# Patient Record
Sex: FEMALE | Race: WHITE | NOT HISPANIC OR LATINO | Employment: FULL TIME | URBAN - METROPOLITAN AREA
[De-identification: names, ages, dates, MRNs, and addresses within clinical notes are randomized per-mention and may not be internally consistent; named-entity substitution may affect disease eponyms.]

---
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
## 2018-01-13 VITALS
RESPIRATION RATE: 12 BRPM | HEART RATE: 87 BPM | SYSTOLIC BLOOD PRESSURE: 120 MMHG | DIASTOLIC BLOOD PRESSURE: 78 MMHG | TEMPERATURE: 98.6 F | HEIGHT: 70 IN | BODY MASS INDEX: 28.27 KG/M2 | WEIGHT: 197.5 LBS

## 2022-05-17 ENCOUNTER — OFFICE VISIT (OUTPATIENT)
Dept: PHYSICAL THERAPY | Facility: REHABILITATION | Age: 22
End: 2022-05-17
Payer: COMMERCIAL

## 2022-05-17 DIAGNOSIS — G89.29 CHRONIC BILATERAL LOW BACK PAIN WITHOUT SCIATICA: Primary | ICD-10-CM

## 2022-05-17 DIAGNOSIS — M54.50 CHRONIC BILATERAL LOW BACK PAIN WITHOUT SCIATICA: Primary | ICD-10-CM

## 2022-05-17 PROCEDURE — 97162 PT EVAL MOD COMPLEX 30 MIN: CPT | Performed by: PHYSICAL THERAPIST
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
## 2022-05-24 ENCOUNTER — OFFICE VISIT (OUTPATIENT)
Dept: PHYSICAL THERAPY | Facility: REHABILITATION | Age: 22
End: 2022-05-24
Payer: COMMERCIAL

## 2022-05-24 DIAGNOSIS — G89.29 CHRONIC BILATERAL LOW BACK PAIN WITHOUT SCIATICA: Primary | ICD-10-CM

## 2022-05-24 DIAGNOSIS — M54.50 CHRONIC BILATERAL LOW BACK PAIN WITHOUT SCIATICA: Primary | ICD-10-CM

## 2022-05-24 PROCEDURE — 97112 NEUROMUSCULAR REEDUCATION: CPT | Performed by: PHYSICAL THERAPIST

## 2022-05-24 NOTE — PROGRESS NOTES
Daily Note     Today's date: 2022  Patient name: Gilma Mcnally  : 2000  MRN: [id number]  Referring provider: Fabiene Barthel, PT  Dx:   Encounter Diagnosis     ICD-10-CM    1  Chronic bilateral low back pain without sciatica  M54 50     G89 29                   Subjective: Upper back is sore when she does the exercises  Objective: See treatment diary below  RFIS: no effect  CHITO: ER pain no worse      Assessment: Pt is very compliant with HEP  Upperback pain due to strain from altering lumbar positioning  Instructed her in an extension stretch for her thoracic region  She demonstrates Rhoda classification of extension dysfunction in the lumbar spine so prone press ups were added to address this issue  Plan: Continue per plan of care  Precautions: Hx of pars fracture  Daily Treatment Diary      Assessment                     Eval/Reval                       FOTO         **         **   Manuals                                                     Exercise Diary    Pelvic tilts 10"x10 10"x10                    pelvic tilt with ball press 10"x10 10"x10                    pelvic tilt with march x10 Dead bugs 2x10                    hip flexor stretch 30"x4  30"x4                    bird dog    x10                    multifidus press    2 steps x5                    thoracic ext over foam roll   x10                    KB squat                                                                                                                                                                                                                       Modalities                                           Access Code: [email or web address]  URL: [email or web address]  Date: 2022  Prepared by: José Antonio Brar    Exercises  Supine Pelvic Tilt - 1 x daily - 3 x weekly - 1 sets - 10 reps - 10 sec hold  Supine March with Posterior Pelvic Tilt - 1 x daily - 7 x weekly - 1 sets - 10 reps - 3 sec hold  Abdominal Press into [place] - 1 x daily - 7 x weekly - 1 sets - 10 reps - 10 sec hold  Half Kneeling Hip Flexor Stretch - 1 x daily - 7 x weekly - 1 sets - 4 reps - 30 sec hold

## 2022-06-24 ENCOUNTER — HOSPITAL ENCOUNTER (OUTPATIENT)
Dept: ULTRASOUND IMAGING | Facility: CLINIC | Age: 22
Discharge: HOME | End: 2022-06-24
Payer: COMMERCIAL

## 2022-06-24 DIAGNOSIS — R10.2 PELVIC PAIN: ICD-10-CM

## 2022-06-24 PROCEDURE — 76830 TRANSVAGINAL US NON-OB: CPT

## 2022-06-24 PROCEDURE — 76856 US EXAM PELVIC COMPLETE: CPT

## 2023-01-03 ENCOUNTER — PROCEDURE VISIT (OUTPATIENT)
Dept: OBGYN CLINIC | Facility: CLINIC | Age: 23
End: 2023-01-03

## 2023-01-03 VITALS
DIASTOLIC BLOOD PRESSURE: 82 MMHG | SYSTOLIC BLOOD PRESSURE: 124 MMHG | BODY MASS INDEX: 38.51 KG/M2 | HEIGHT: 70 IN | WEIGHT: 269 LBS

## 2023-01-03 DIAGNOSIS — Z30.432 ENCOUNTER FOR IUD REMOVAL: Primary | ICD-10-CM

## 2023-01-03 RX ORDER — ALBUTEROL SULFATE 90 UG/1
AEROSOL, METERED RESPIRATORY (INHALATION)
COMMUNITY
Start: 2022-11-15 | End: 2023-01-03

## 2023-01-03 RX ORDER — COPPER 313.4 MG/1
1 INTRAUTERINE DEVICE INTRAUTERINE
COMMUNITY

## 2023-01-03 RX ORDER — ALBUTEROL SULFATE 90 UG/1
2 AEROSOL, METERED RESPIRATORY (INHALATION) EVERY 6 HOURS PRN
COMMUNITY
Start: 2022-11-15 | End: 2023-01-03

## 2023-01-03 NOTE — PROGRESS NOTES
Iud removal    Date/Time: 1/3/2023 10:50 AM  Performed by: Zehra Blandon MD  Authorized by: Zehra Blandon MD   Universal Protocol:  Consent: Verbal consent obtained  Written consent not obtained  Risks and benefits: risks, benefits and alternatives were discussed  Consent given by: patient  Time out: Immediately prior to procedure a "time out" was called to verify the correct patient, procedure, equipment, support staff and site/side marked as required  Timeout called at: 1/3/2023 10:30 AM   Patient understanding: patient states understanding of the procedure being performed  Patient consent: the patient's understanding of the procedure matches consent given  Procedure consent: procedure consent matches procedure scheduled  Relevant documents: relevant documents present and verified  Test results: test results not available  Site marked: the operative site was not marked  Radiology Images displayed and confirmed  If images not available, report reviewed: imaging studies available  Required items: required blood products, implants, devices, and special equipment available  Patient identity confirmed: verbally with patient      Procedure:     Removed with no complications: yes    Comments:      Paragard removed intact, shown to patient, and discarded  Encouraged to take PNV with folic acid   Call with any questions/ concerns    Follow up yearly or prn

## 2023-08-08 ENCOUNTER — OFFICE VISIT (OUTPATIENT)
Dept: OBGYN CLINIC | Facility: CLINIC | Age: 23
End: 2023-08-08
Payer: COMMERCIAL

## 2023-08-08 VITALS
HEIGHT: 70 IN | BODY MASS INDEX: 37.37 KG/M2 | SYSTOLIC BLOOD PRESSURE: 128 MMHG | DIASTOLIC BLOOD PRESSURE: 76 MMHG | WEIGHT: 261 LBS

## 2023-08-08 DIAGNOSIS — N91.2 AMENORRHEA: Primary | ICD-10-CM

## 2023-08-08 PROCEDURE — 76817 TRANSVAGINAL US OBSTETRIC: CPT | Performed by: OBSTETRICS & GYNECOLOGY

## 2023-08-08 PROCEDURE — 99214 OFFICE O/P EST MOD 30 MIN: CPT | Performed by: OBSTETRICS & GYNECOLOGY

## 2023-08-08 NOTE — PROGRESS NOTES
Assessment/Plan:  Diagnoses and all orders for this visit:    Amenorrhea  -     Ambulatory Referral to Maternal Fetal Medicine; Future  -     AMB US OB < 14 weeks single or first gestation level 1    Other orders  -     Prenatal MV-Min-Fe Fum-FA-DHA (PRENATAL 1 PO); Take by mouth        - Viable IUP @ 8w 6d EGA  - ZEE 2024  - Continue PNV  - Patient to call for concerns  - RTO 3 weeks for OB intake            Subjective:       Patient ID: Rozina Collins 2000        Rozina Collins is a 23 y.o. Audrea Reil presenting to the office for pregnancy confirmation. Patient's last menstrual period was 2023 (exact date). , placing her at [address] [facility]"" today with ZEE of 2024. She is feeling nauseous        OB History    Para Term  AB Living   0 0 0 0 0 0   SAB IAB Ectopic Multiple Live Births   0 0 0 0 0   Obstetric Comments   Menarche 10         The following portions of the patient's history were reviewed and updated as appropriate: allergies, past family history, past social history and problem list.    Allergies:  Patient has no known allergies. Medications:    Current Outpatient Medications:   •  Prenatal MV-Min-Fe Fum-FA-DHA (PRENATAL 1 PO), Take by mouth, Disp: , Rfl:   •  intrauterine copper (PARAGARD) IUD, 1 each by Intrauterine route (Patient not taking: Reported on 2023), Disp: , Rfl:       Review of Systems:   Review of Systems       Objective:       Visit Vitals  /76 (BP Location: Right arm, Patient Position: Sitting, Cuff Size: Large)   Ht 5' 9.5" (1.765 m)   Wt 118 kg (261 lb)   LMP 2023 (Exact Date)   BMI 37.99 kg/m²   OB Status Unknown   Smoking Status Never   BSA 2.32 m²        GEN: The patient was alert and oriented x3, pleasant well-appearing female in no acute distress.    CV: Regular rate  PULM: nonlabored respirations  MSK: Normal gait  Skin: warm, dry  Neuro: no focal deficits  Psych: normal affect and judgement, cooperative    Ultrasound:     Viability US Gestational sac: present               Location: intrauterine  Yolk sac: present  Fetal pole: present               CRL: 1.96 cm = 8w4d  Cardiac activity: present               Rate: 174 bpm     Ovaries: normal appearing bilaterally  Cul de sac: absence of free fluid  Uterus: normal in appearance           Ultrasound Probe Disinfection    A transvaginal ultrasound was performed.    Prior to use, disinfection was performed with High Level Disinfection Process (Trophon)  Probe serial number RVRSDE: [id number] was used    Sanaz Weiner MD  08/08/23  7:59 AM

## 2023-08-30 ENCOUNTER — INITIAL PRENATAL (OUTPATIENT)
Dept: OBGYN CLINIC | Facility: CLINIC | Age: 23
End: 2023-08-30

## 2023-08-30 ENCOUNTER — APPOINTMENT (OUTPATIENT)
Age: 23
End: 2023-08-30
Payer: COMMERCIAL

## 2023-08-30 VITALS — HEIGHT: 70 IN | BODY MASS INDEX: 37.16 KG/M2 | WEIGHT: 259.6 LBS

## 2023-08-30 DIAGNOSIS — O99.211 OBESITY AFFECTING PREGNANCY IN FIRST TRIMESTER: ICD-10-CM

## 2023-08-30 DIAGNOSIS — Z3A.12 12 WEEKS GESTATION OF PREGNANCY: ICD-10-CM

## 2023-08-30 DIAGNOSIS — O99.211 OBESITY AFFECTING PREGNANCY IN FIRST TRIMESTER: Primary | ICD-10-CM

## 2023-08-30 LAB
BASOPHILS # BLD AUTO: 0.02 THOUSANDS/ÂΜL (ref 0–0.1)
BASOPHILS NFR BLD AUTO: 0 % (ref 0–1)
EOSINOPHIL # BLD AUTO: 0.05 THOUSAND/ÂΜL (ref 0–0.61)
EOSINOPHIL NFR BLD AUTO: 1 % (ref 0–6)
ERYTHROCYTE [DISTWIDTH] IN BLOOD BY AUTOMATED COUNT: 14.6 % (ref 11.6–15.1)
HCT VFR BLD AUTO: 40.6 % (ref 34.8–46.1)
HGB BLD-MCNC: 13.6 G/DL (ref 11.5–15.4)
IMM GRANULOCYTES # BLD AUTO: 0.02 THOUSAND/UL (ref 0–0.2)
IMM GRANULOCYTES NFR BLD AUTO: 0 % (ref 0–2)
LYMPHOCYTES # BLD AUTO: 2.07 THOUSANDS/ÂΜL (ref 0.6–4.47)
LYMPHOCYTES NFR BLD AUTO: 25 % (ref 14–44)
MCH RBC QN AUTO: 29.7 PG (ref 26.8–34.3)
MCHC RBC AUTO-ENTMCNC: 33.5 G/DL (ref 31.4–37.4)
MCV RBC AUTO: 89 FL (ref 82–98)
MONOCYTES # BLD AUTO: 0.54 THOUSAND/ÂΜL (ref 0.17–1.22)
MONOCYTES NFR BLD AUTO: 7 % (ref 4–12)
NEUTROPHILS # BLD AUTO: 5.56 THOUSANDS/ÂΜL (ref 1.85–7.62)
NEUTS SEG NFR BLD AUTO: 67 % (ref 43–75)
NRBC BLD AUTO-RTO: 0 /100 WBCS
PLATELET # BLD AUTO: 325 THOUSANDS/UL (ref 149–390)
PMV BLD AUTO: 9.5 FL (ref 8.9–12.7)
RBC # BLD AUTO: 4.58 MILLION/UL (ref 3.81–5.12)
WBC # BLD AUTO: 8.26 THOUSAND/UL (ref 4.31–10.16)

## 2023-08-30 PROCEDURE — 86900 BLOOD TYPING SEROLOGIC ABO: CPT

## 2023-08-30 PROCEDURE — 87086 URINE CULTURE/COLONY COUNT: CPT

## 2023-08-30 PROCEDURE — 87389 HIV-1 AG W/HIV-1&-2 AB AG IA: CPT

## 2023-08-30 PROCEDURE — 87340 HEPATITIS B SURFACE AG IA: CPT

## 2023-08-30 PROCEDURE — 86850 RBC ANTIBODY SCREEN: CPT

## 2023-08-30 PROCEDURE — 86780 TREPONEMA PALLIDUM: CPT

## 2023-08-30 PROCEDURE — OBC

## 2023-08-30 PROCEDURE — 86762 RUBELLA ANTIBODY: CPT

## 2023-08-30 PROCEDURE — 86803 HEPATITIS C AB TEST: CPT

## 2023-08-30 PROCEDURE — 85025 COMPLETE CBC W/AUTO DIFF WBC: CPT

## 2023-08-30 PROCEDURE — 86787 VARICELLA-ZOSTER ANTIBODY: CPT

## 2023-08-30 PROCEDURE — 36415 COLL VENOUS BLD VENIPUNCTURE: CPT

## 2023-08-30 PROCEDURE — 86901 BLOOD TYPING SEROLOGIC RH(D): CPT

## 2023-08-30 NOTE — PROGRESS NOTES
OB INTAKE INTERVIEW  Pt presents for OB intake. Plan:  - Prenatal labs ordered  - Referral given for MFM   -NT scheduled for  and level 2 10/26  - Reviewed Genetic testing options   -Declined CF and SMA at this time  - Patient to call for concerns  - RTO 4 weeks for OB F/U visit and PAP/Cultures         OB History    Para Term  AB Living   1 0 0 0 0 0   SAB IAB Ectopic Multiple Live Births   0 0 0 0 0      # Outcome Date GA Lbr Jose/2nd Weight Sex Delivery Anes PTL Lv   1 Current               Obstetric Comments   Menarche 14     Hx of  delivery prior to 36 weeks 6 days: No  Last Menstrual Period:    Patient's last menstrual period was 2023 (exact date). Ultrasound date: 2023  8 weeks 6 days     Estimated Date of Delivery: 3/13/2023    Current Issues:  Constipation :   No  Headaches :   Yes  Cramping:  No  Spotting :   No      Interview education  • [facility] Pregnancy Essentials reviewed and discussed   • Baby and [address] Handout  • [facility] MFM Handout  • Discussed genetic testing  • Prenatal lab work: Scripts printed and given to pt. • Influenza vaccine given today: No  • Discussed Tdap vaccine.    Immunizations:   Immunization History   Administered Date(s) Administered   • DTaP 5 2000, 1999, 1999, 2000, 10/25/2004   • Hep B, adult 2000, 01/15/1999, 1999   • Hib (PRP-OMP) 1998, 1999, 1999, 2000   • IPV 2000, 10/21/2002   • MMR 10/25/2004   • MMRV 2007   • Meningococcal, Unknown Serogroups 10/28/2010, 2017   • OPV 01/15/1999, 1999, 10/26/1999   • Rotavirus Pentavalent 01/15/1999, 1999, 1999   • Tdap 2011   • Varicella 2000       Diabetes              Pregestational DM: No               hx of GDM: No              BMI >35: Yes              first degree relative with type 2 diabetes: No              hx of PCOS: No              current metformin use: No              prior hx of LGA/macrosomia: No                   Hypertension              Hx of chronic HTN: No              hx of gestational HTN: No              hx of preeclampsia, eclampsia, or HELLP syndrome: No              Family h/o preeclampsia: No              Age 28 or older: No              Multifetal gestation: No  Type 1 or Type 2 DM: No  Renal Disease: No  Autoimmune disease (systemic lupus erythematosus, antiphospholipid antibody syndrome): No  Nulliparity: Yes  Obesity (BMI over 30): Yes  More than 10 year pregnancy interval: No  Previous IUGR, low birthweight or small for gestational age: No       Immunizations:              influenza vaccine: N/A               discussed Tdap vaccine administration at 27-28 weeks   Covid Vaccination: Vaccinated      Dental visit with last 6 months - Yes  PHQ-2/9 score: 0         MyChart activated (not 1318 years of age)?: Yes  The patient was oriented to our practice and all questions were answered.   Interviewed by: Elizabeth Zambrano RN 08/30/23

## 2023-08-31 LAB
ABO GROUP BLD: NORMAL
BLD GP AB SCN SERPL QL: NEGATIVE
HBV SURFACE AG SER QL: NORMAL
HCV AB SER QL: NORMAL
RH BLD: POSITIVE
RUBV IGG SERPL IA-ACNC: 102.5 IU/ML
SPECIMEN EXPIRATION DATE: NORMAL
TREPONEMA PALLIDUM IGG+IGM AB [PRESENCE] IN SERUM OR PLASMA BY IMMUNOASSAY: NORMAL
VZV IGG SER QL IA: ABNORMAL

## 2023-09-01 LAB
BACTERIA UR CULT: NORMAL
EXTERNAL HIV-1/2 AB-AG: NORMAL
HIV 1+2 AB+HIV1 P24 AG SERPL QL IA: NON REACTIVE

## 2023-09-05 ENCOUNTER — ROUTINE PRENATAL (OUTPATIENT)
Dept: PERINATAL CARE | Facility: OTHER | Age: 23
End: 2023-09-05
Payer: COMMERCIAL

## 2023-09-05 VITALS
HEIGHT: 70 IN | HEART RATE: 92 BPM | DIASTOLIC BLOOD PRESSURE: 68 MMHG | BODY MASS INDEX: 36.79 KG/M2 | SYSTOLIC BLOOD PRESSURE: 112 MMHG | WEIGHT: 257 LBS

## 2023-09-05 DIAGNOSIS — Z36.82 ENCOUNTER FOR (NT) NUCHAL TRANSLUCENCY SCAN: ICD-10-CM

## 2023-09-05 DIAGNOSIS — O99.211 OBESITY AFFECTING PREGNANCY IN FIRST TRIMESTER: ICD-10-CM

## 2023-09-05 DIAGNOSIS — Z3A.12 12 WEEKS GESTATION OF PREGNANCY: Primary | ICD-10-CM

## 2023-09-05 DIAGNOSIS — N91.2 AMENORRHEA: ICD-10-CM

## 2023-09-05 PROCEDURE — 76801 OB US < 14 WKS SINGLE FETUS: CPT | Performed by: STUDENT IN AN ORGANIZED HEALTH CARE EDUCATION/TRAINING PROGRAM

## 2023-09-05 PROCEDURE — 76813 OB US NUCHAL MEAS 1 GEST: CPT | Performed by: STUDENT IN AN ORGANIZED HEALTH CARE EDUCATION/TRAINING PROGRAM

## 2023-09-05 PROCEDURE — 99203 OFFICE O/P NEW LOW 30 MIN: CPT | Performed by: STUDENT IN AN ORGANIZED HEALTH CARE EDUCATION/TRAINING PROGRAM

## 2023-09-05 RX ORDER — ASPIRIN 81 MG/1
162 TABLET, CHEWABLE ORAL DAILY
Qty: 180 TABLET | Refills: 2 | Status: SHIPPED | OUTPATIENT
Start: 2023-09-05

## 2023-09-05 NOTE — PROGRESS NOTES
[address]: Ms. Xi Treadwell was seen today for nuchal translucency ultrasound. See ultrasound report under "OB Procedures" tab. Physical Exam  Constitutional:       General: She is not in acute distress. Appearance: Normal appearance. HENT:      Head: Normocephalic and atraumatic. Eyes:      Extraocular Movements: Extraocular movements intact. Cardiovascular:      Rate and Rhythm: Normal rate. Pulmonary:      Effort: Pulmonary effort is normal. No respiratory distress. Skin:     Findings: No erythema or rash. Neurological:      Mental Status: She is alert and oriented to person, place, and time. Psychiatric:         Mood and Affect: Mood normal.         Behavior: Behavior normal.         Please don't hesitate to contact our office with any concerns or questions.   -Brandy Huffman MD

## 2023-09-05 NOTE — LETTER
September 5, 2023     [facility], [address] [place] [address]  [address]  [facility]    Patient: Rylee Church   YOB: 2000   Date of Visit: 9/5/2023       Dear Dr. Lana Barrett:    Thank you for referring Rylee Church to me for evaluation. Below are my notes for this consultation. If you have questions, please do not hesitate to call me. I look forward to following your patient along with you. Sincerely,        Marylene No, MD        CC: No Recipients    Marylene No, MD  9/5/2023  9:23 AM  Sign when Signing Visit  [address]: Ms. Red Rouse was seen today for nuchal translucency ultrasound. See ultrasound report under "OB Procedures" tab. Physical Exam  Constitutional:       General: She is not in acute distress. Appearance: Normal appearance. HENT:      Head: Normocephalic and atraumatic. Eyes:      Extraocular Movements: Extraocular movements intact. Cardiovascular:      Rate and Rhythm: Normal rate. Pulmonary:      Effort: Pulmonary effort is normal. No respiratory distress. Skin:     Findings: No erythema or rash. Neurological:      Mental Status: She is alert and oriented to person, place, and time. Psychiatric:         Mood and Affect: Mood normal.         Behavior: Behavior normal.         Please don't hesitate to contact our office with any concerns or questions.   -Marylene No, MD

## 2023-09-09 ENCOUNTER — HOSPITAL ENCOUNTER (EMERGENCY)
Facility: HOSPITAL | Age: 23
Discharge: HOME | End: 2023-09-09
Attending: EMERGENCY MEDICINE
Payer: COMMERCIAL

## 2023-09-09 VITALS
HEIGHT: 70 IN | SYSTOLIC BLOOD PRESSURE: 123 MMHG | OXYGEN SATURATION: 100 % | DIASTOLIC BLOOD PRESSURE: 65 MMHG | RESPIRATION RATE: 18 BRPM | TEMPERATURE: 97.3 F | HEART RATE: 83 BPM | BODY MASS INDEX: 36.94 KG/M2 | WEIGHT: 258 LBS

## 2023-09-09 DIAGNOSIS — Z34.90 PREGNANT: ICD-10-CM

## 2023-09-09 DIAGNOSIS — T75.00XA: Primary | ICD-10-CM

## 2023-09-09 PROCEDURE — 76815 OB US LIMITED FETUS(S): CPT | Performed by: EMERGENCY MEDICINE

## 2023-09-09 PROCEDURE — 93005 ELECTROCARDIOGRAM TRACING: CPT

## 2023-09-09 PROCEDURE — 99284 EMERGENCY DEPT VISIT MOD MDM: CPT

## 2023-09-09 PROCEDURE — 99284 EMERGENCY DEPT VISIT MOD MDM: CPT | Performed by: EMERGENCY MEDICINE

## 2023-09-10 LAB
ATRIAL RATE: 99 BPM
P AXIS: 75 DEGREES
PR INTERVAL: 162 MS
QRS AXIS: 74 DEGREES
QRSD INTERVAL: 76 MS
QT INTERVAL: 334 MS
QTC INTERVAL: 428 MS
T WAVE AXIS: 35 DEGREES
VENTRICULAR RATE: 99 BPM

## 2023-09-10 PROCEDURE — 93010 ELECTROCARDIOGRAM REPORT: CPT | Performed by: INTERNAL MEDICINE

## 2023-09-10 NOTE — ED NOTES
Physician at bedside performing abdominal ultrasound with patient.       Fernando Marin RN  09/09/23 2043
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
## 2023-09-28 ENCOUNTER — ROUTINE PRENATAL (OUTPATIENT)
Dept: OBGYN CLINIC | Facility: CLINIC | Age: 23
End: 2023-09-28

## 2023-09-28 VITALS — WEIGHT: 257.8 LBS | SYSTOLIC BLOOD PRESSURE: 114 MMHG | DIASTOLIC BLOOD PRESSURE: 76 MMHG | BODY MASS INDEX: 36.99 KG/M2

## 2023-09-28 DIAGNOSIS — Z36.9 ANTENATAL SCREENING ENCOUNTER: ICD-10-CM

## 2023-09-28 DIAGNOSIS — Z34.02 PRENATAL CARE, FIRST PREGNANCY IN SECOND TRIMESTER: Primary | ICD-10-CM

## 2023-09-28 PROCEDURE — PNV: Performed by: PHYSICIAN ASSISTANT

## 2023-09-28 PROCEDURE — G0145 SCR C/V CYTO,THINLAYER,RESCR: HCPCS | Performed by: PHYSICIAN ASSISTANT

## 2023-09-28 PROCEDURE — 87591 N.GONORRHOEAE DNA AMP PROB: CPT | Performed by: PHYSICIAN ASSISTANT

## 2023-09-28 PROCEDURE — 87491 CHLMYD TRACH DNA AMP PROBE: CPT | Performed by: PHYSICIAN ASSISTANT

## 2023-09-28 NOTE — PROGRESS NOTES
23 y.o. Safia Loredo female at 16w1d (Estimated Date of Delivery: 3/13/24) for PNV. Pre- Vitals    Flowsheet Row Most Recent Value   Prenatal Assessment    Prenatal Vitals    Blood Pressure 114/76   Weight - Scale 117 kg (257 lb 12.8 oz)   Urine Albumin/Glucose    Dilation/Effacement/Station    Vaginal Drainage    Edema         TWG: -3.266 kg (-7 lb 3.2 oz)    Leakage of fluid: no  Vaginal bleeding: no  Contractions/Cramping: no  Fetal movement: no  Feeling well  Declines all early extra testing  Routine ob BW normal except not immune to varicella--needs pp vaccine  Pap done today  Flu shot declined  RTO in 4 weeks.

## 2023-10-02 LAB
C TRACH DNA SPEC QL NAA+PROBE: NEGATIVE
N GONORRHOEA DNA SPEC QL NAA+PROBE: NEGATIVE

## 2023-10-05 LAB
LAB AP GYN PRIMARY INTERPRETATION: NORMAL
Lab: NORMAL

## 2023-10-25 ENCOUNTER — ROUTINE PRENATAL (OUTPATIENT)
Dept: OBGYN CLINIC | Facility: CLINIC | Age: 23
End: 2023-10-25

## 2023-10-25 VITALS — SYSTOLIC BLOOD PRESSURE: 128 MMHG | WEIGHT: 260 LBS | DIASTOLIC BLOOD PRESSURE: 76 MMHG | BODY MASS INDEX: 37.31 KG/M2

## 2023-10-25 DIAGNOSIS — O99.212 OBESITY AFFECTING PREGNANCY IN SECOND TRIMESTER, UNSPECIFIED OBESITY TYPE: ICD-10-CM

## 2023-10-25 DIAGNOSIS — Z3A.20 20 WEEKS GESTATION OF PREGNANCY: Primary | ICD-10-CM

## 2023-10-25 PROBLEM — Z34.82 PRENATAL CARE, SUBSEQUENT PREGNANCY IN SECOND TRIMESTER: Status: RESOLVED | Noted: 2023-10-25 | Resolved: 2023-10-25

## 2023-10-25 PROBLEM — Z34.82 PRENATAL CARE, SUBSEQUENT PREGNANCY IN SECOND TRIMESTER: Status: ACTIVE | Noted: 2023-10-25

## 2023-10-25 PROBLEM — M40.209 ACQUIRED KYPHOSIS: Status: ACTIVE | Noted: 2020-06-18

## 2023-10-25 PROCEDURE — PNV: Performed by: OBSTETRICS & GYNECOLOGY

## 2023-10-25 NOTE — PATIENT INSTRUCTIONS
Please refer to [place] Pregnancy Essentials Guide  [facility] ([email or web address])  to access our pregnancy essentials reference guide. Here you will find a lot of information for all trimesters of pregnancy as well as postpartum. You will be able to access information about medications that are safe to use during pregnancy, warning signs in third trimester, what to pack for your hospital stay and many other useful guides. There is also information on class available through our [address] and Pregnancy Classes  [facility] ([email or web address]). Please do not hesitate to contact the office through [address] [place] [address] or by calling for [phone] with any questions or concerns. We look forward to seeing you at your next scheduled visit!

## 2023-10-25 NOTE — PROGRESS NOTES
20 y.o. Neto Aceves female at 24w0d (Estimated Date of Delivery: 3/13/24) for PNV. She is doing well, no concerns. Pre- Vitals      Flowsheet Row Most Recent Value   Prenatal Assessment    Fetal Heart Rate 160   Fundal Height (cm) 20 cm   Movement Present   Prenatal Vitals    Blood Pressure 128/76   Weight - Scale 118 kg (260 lb)   Urine Albumin/Glucose    Dilation/Effacement/Station    Vaginal Drainage    Edema         TWG: -2.268 kg (-5 lb)    Leakage of fluid: no  Vaginal bleeding: no  Contractions/Cramping: no  Fetal movement: yes    She has anatomy scan scheduled tomorrow. RTO in 4 weeks.

## 2023-10-26 ENCOUNTER — ROUTINE PRENATAL (OUTPATIENT)
Dept: PERINATAL CARE | Facility: OTHER | Age: 23
End: 2023-10-26
Payer: COMMERCIAL

## 2023-10-26 VITALS
HEIGHT: 70 IN | DIASTOLIC BLOOD PRESSURE: 64 MMHG | HEART RATE: 102 BPM | BODY MASS INDEX: 37.22 KG/M2 | WEIGHT: 260 LBS | SYSTOLIC BLOOD PRESSURE: 122 MMHG

## 2023-10-26 DIAGNOSIS — Z3A.20 20 WEEKS GESTATION OF PREGNANCY: ICD-10-CM

## 2023-10-26 DIAGNOSIS — Z36.86 ENCOUNTER FOR ANTENATAL SCREENING FOR CERVICAL LENGTH: ICD-10-CM

## 2023-10-26 DIAGNOSIS — O99.212 OTHER OBESITY DUE TO EXCESS CALORIES AFFECTING PREGNANCY IN SECOND TRIMESTER: Primary | ICD-10-CM

## 2023-10-26 DIAGNOSIS — E66.09 OTHER OBESITY DUE TO EXCESS CALORIES AFFECTING PREGNANCY IN SECOND TRIMESTER: Primary | ICD-10-CM

## 2023-10-26 PROCEDURE — 76817 TRANSVAGINAL US OBSTETRIC: CPT | Performed by: OBSTETRICS & GYNECOLOGY

## 2023-10-26 PROCEDURE — 76811 OB US DETAILED SNGL FETUS: CPT | Performed by: OBSTETRICS & GYNECOLOGY

## 2023-10-26 NOTE — PROGRESS NOTES
The patient was seen today for an ultrasound. Please see ultrasound report (located under Ob Procedures) for additional details. Thank you very much for allowing us to participate in the care of this very nice patient. Should you have any questions, please do not hesitate to contact me. Jose Michaud [place] [id number] [facility]  Attending Physician, [address] [place] [address]

## 2023-11-20 ENCOUNTER — ROUTINE PRENATAL (OUTPATIENT)
Dept: OBGYN CLINIC | Facility: CLINIC | Age: 23
End: 2023-11-20
Payer: COMMERCIAL

## 2023-11-20 VITALS — DIASTOLIC BLOOD PRESSURE: 70 MMHG | BODY MASS INDEX: 38.17 KG/M2 | WEIGHT: 266 LBS | SYSTOLIC BLOOD PRESSURE: 120 MMHG

## 2023-11-20 DIAGNOSIS — Z23 NEED FOR INFLUENZA VACCINATION: ICD-10-CM

## 2023-11-20 DIAGNOSIS — Z3A.23 23 WEEKS GESTATION OF PREGNANCY: ICD-10-CM

## 2023-11-20 DIAGNOSIS — O99.212 OBESITY AFFECTING PREGNANCY IN SECOND TRIMESTER, UNSPECIFIED OBESITY TYPE: Primary | ICD-10-CM

## 2023-11-20 PROCEDURE — 90686 IIV4 VACC NO PRSV 0.5 ML IM: CPT | Performed by: OBSTETRICS & GYNECOLOGY

## 2023-11-20 PROCEDURE — PNV: Performed by: OBSTETRICS & GYNECOLOGY

## 2023-11-20 PROCEDURE — 90471 IMMUNIZATION ADMIN: CPT | Performed by: OBSTETRICS & GYNECOLOGY

## 2023-11-20 NOTE — PROGRESS NOTES
20 y.o.   female at 18.11 wga for PNV. BP : 120/70. TW  Feeling well.   No complaints  Ordered 28 week labs  Gave flu vaccine  Reviewed PTL precautions   Growth at 32 week  F/u 4 weeks

## 2023-12-04 ENCOUNTER — LAB (OUTPATIENT)
Age: 23
End: 2023-12-04
Payer: COMMERCIAL

## 2023-12-04 DIAGNOSIS — O99.212 OBESITY AFFECTING PREGNANCY IN SECOND TRIMESTER, UNSPECIFIED OBESITY TYPE: ICD-10-CM

## 2023-12-04 DIAGNOSIS — Z3A.23 23 WEEKS GESTATION OF PREGNANCY: ICD-10-CM

## 2023-12-04 LAB
ERYTHROCYTE [DISTWIDTH] IN BLOOD BY AUTOMATED COUNT: 14.5 % (ref 11.6–15.1)
GLUCOSE 1H P 50 G GLC PO SERPL-MCNC: 74 MG/DL (ref 40–134)
HCT VFR BLD AUTO: 37.7 % (ref 34.8–46.1)
HGB BLD-MCNC: 12.3 G/DL (ref 11.5–15.4)
MCH RBC QN AUTO: 30.1 PG (ref 26.8–34.3)
MCHC RBC AUTO-ENTMCNC: 32.6 G/DL (ref 31.4–37.4)
MCV RBC AUTO: 92 FL (ref 82–98)
PLATELET # BLD AUTO: 295 THOUSANDS/UL (ref 149–390)
PMV BLD AUTO: 10.5 FL (ref 8.9–12.7)
RBC # BLD AUTO: 4.09 MILLION/UL (ref 3.81–5.12)
TREPONEMA PALLIDUM IGG+IGM AB [PRESENCE] IN SERUM OR PLASMA BY IMMUNOASSAY: NORMAL
WBC # BLD AUTO: 8.15 THOUSAND/UL (ref 4.31–10.16)

## 2023-12-04 PROCEDURE — 85027 COMPLETE CBC AUTOMATED: CPT

## 2023-12-04 PROCEDURE — 86780 TREPONEMA PALLIDUM: CPT

## 2023-12-04 PROCEDURE — 82950 GLUCOSE TEST: CPT

## 2023-12-04 PROCEDURE — 36415 COLL VENOUS BLD VENIPUNCTURE: CPT

## 2023-12-11 ENCOUNTER — CLINICAL SUPPORT (OUTPATIENT)
Age: 23
End: 2023-12-11

## 2023-12-11 DIAGNOSIS — Z32.2 ENCOUNTER FOR CHILDBIRTH INSTRUCTION: Primary | ICD-10-CM

## 2023-12-21 ENCOUNTER — ROUTINE PRENATAL (OUTPATIENT)
Dept: OBGYN CLINIC | Facility: CLINIC | Age: 23
End: 2023-12-21
Payer: COMMERCIAL

## 2023-12-21 VITALS — BODY MASS INDEX: 38.88 KG/M2 | DIASTOLIC BLOOD PRESSURE: 78 MMHG | SYSTOLIC BLOOD PRESSURE: 120 MMHG | WEIGHT: 271 LBS

## 2023-12-21 DIAGNOSIS — Z3A.28 28 WEEKS GESTATION OF PREGNANCY: ICD-10-CM

## 2023-12-21 DIAGNOSIS — E66.09 CLASS 2 OBESITY DUE TO EXCESS CALORIES WITHOUT SERIOUS COMORBIDITY WITH BODY MASS INDEX (BMI) OF 38.0 TO 38.9 IN ADULT: ICD-10-CM

## 2023-12-21 DIAGNOSIS — Z23 NEED FOR TDAP VACCINATION: Primary | ICD-10-CM

## 2023-12-21 DIAGNOSIS — O99.213 OTHER OBESITY DUE TO EXCESS CALORIES AFFECTING PREGNANCY IN THIRD TRIMESTER: ICD-10-CM

## 2023-12-21 DIAGNOSIS — E66.09 OTHER OBESITY DUE TO EXCESS CALORIES AFFECTING PREGNANCY IN THIRD TRIMESTER: ICD-10-CM

## 2023-12-21 PROCEDURE — 90715 TDAP VACCINE 7 YRS/> IM: CPT | Performed by: OBSTETRICS & GYNECOLOGY

## 2023-12-21 PROCEDURE — PNV: Performed by: OBSTETRICS & GYNECOLOGY

## 2023-12-21 PROCEDURE — 90471 IMMUNIZATION ADMIN: CPT | Performed by: OBSTETRICS & GYNECOLOGY

## 2023-12-21 NOTE — PROGRESS NOTES
Pt is here for her 28w prenatal. Redfoler,tdap due.  Pt declines breast pump.Pt denies any leakage,bleeding,pressure. Pt has some mild BH. Urine dip is neg/neg

## 2023-12-21 NOTE — PROGRESS NOTES
23 y.o.  female at 28w1d (Estimated Date of Delivery: 3/13/24) for PNV.    Pre-Sharon Vitals      Flowsheet Row Most Recent Value   Prenatal Assessment    Fetal Heart Rate 155   Movement Present   Prenatal Vitals    Blood Pressure 120/78   Weight - Scale 123 kg (271 lb)   Urine Albumin/Glucose    Dilation/Effacement/Station    Vaginal Drainage    Edema           TW.722 kg (6 lb)    28 wk labs reviewed.    - GTT 74, Hgb 12.3, plts 295  Red folder given and reviewed. Discussed Fetal kick counts, PTL/Labor information, Baby & Me Classes.     Consent signed - ok with transfusion, full code.   Current visitor policy reviewed.   Patient plans to breastfeed.   Skin to skin, rooming in, delayed cord clamp, pain management in labor discussed.    TDAP was given.  Rhogam was not indicated.    Leakage of fluid: no  Vaginal bleeding: no  Contractions/Cramping: no  Fetal movement: yes      RTO in 2 weeks.

## 2023-12-21 NOTE — PATIENT INSTRUCTIONS
The Third Trimester  (28-42 weeks)  YOUR BABY   * your baby sucks its thumb now!   * your baby can hear voices and respond to touch…..so talk to him or her!!   * your baby’s brain grows and develops most in the last 2 months of pregnancy   * baby’s head and bones are soft and flexible so they can fit through the birth canal   * baby’s movements change towards the end of pregnancy because there is less room for kicking and stretching in your belly   * baby’s lungs are not fully developed and completely ready to breathe on their own until the last 3-4 weeks before your due date    YOUR BODY   * your belly is growing a lot now   * it may become more difficult to sleep well at night or to be as active as you usually are   * you may sweat more than usual   * you will become more off-balance……be careful not to fall!!   * you may develop hemorrhoids (which can be painful and make it difficult to sit down)   * the last two months of pregnancy can become very uncomfortable……with backaches, headaches, and heartburn   * you can start to have contractions…….as long as they are irregular and less than 5 per hour, this is a normal part of your body getting ready to have a baby   * your cervix may start to thin out and open up……to get ready for delivery   * you may find yourself needing to “pee” very often…….because baby is pressing on your bladder so much   * you may get out of breathe more quickly than usual      FETAL KICK COUNTS    In the third trimester (after 28 weeks gestation) you should be performing fetal kick counts every day.  Your baby should move at least 10 times in 2 hours during an active time, once a day.    Choose atime of day when your baby is most active.  Try to do this around the same time each day.  Get into a comfortable position and then write down the time your baby first moves.  Count each movement until the baby moves 10 times.  These movements include kicks, punches, nudges, flutters, or rolls.  This  can take anywhere from 5 minutes to 2 hours.  Write down the time you feel the baby's 10th movement.    If 2 hours has passed and your baby has not moved at least 10 times, you should CALL THE OFFICE RIGHT AWAY.  [phone]          PREMATURE LABOR     When to call [phone]:  * I need to call immediately if I have even a small amount of LIQUID leaking from my vagina, with or without contractions.   * I need to call if I am BLEEDING from my vagina.   * I need to call if I am feeling CRAMPING that continues after drinking 2-3 glasses of water and lying down on my side for one hour and that feels like I am having a period.   * I need to call if I feel CONTRACTIONS  more than 4 times in an hour that feels like the baby is “balling up” even after I try drinking 2-3 glasses of water and lying down on my side for an hour.   * I need to call if I notice a change in my vaginal DISCHARGE.   * I need to call if I am feeling PELVIC PRESSURE  that feels like the baby is pushing down into my vagina and lasts more than an hour.   * I need to call if I have LOW BACKACHE which is new and near my tailbone.  It may either come and go several times during an hour or stay there constantly.          PRE-ECLAMPSIA     What is it?   Pre-eclampsia is a serious disease that can occur during pregnancy related to high blood pressures.  It can happen to any woman.     Why should I care?   Women who develop pre-eclampsia have serious risks which can include seizures, stroke, organ damage, premature birth of their baby.  In the very worst cases, it can cause death of the mother and/or their baby.     What should I pay attention to?   Signs and symptoms of pre-eclampsia can include:   * Severe swelling of face or hands    * A headache that will not go away even after you have taken Tylenol   * Seeing spots or changes in eyesight    * Pain in the upper abdomen or shoulder    * New nausea and vomiting (in the second half of pregnancy)    *  Sudden weight gain    * Difficulty breathing     What should I do?   If you experience any of the above symptoms of pre-eclampsia, contact your OB provider.  Finding pre-eclampsia early is important for you and your baby.  Call us at [phone]      BREASTFEEDING     BENEFITS FOR BABIES   * stronger immune systems (less allergies, eczema, asthma, and childhood cancers)   * less diarrhea and constipation or other GI diseases   * fewer colds and ear infections   * better vision and teeth (fewer cavities)   * improves IQ   * lower rates of diabetes and obesity in childhood     BENEFITS FOR MOMS   * promotes faster weight loss after delivery   * lower risk for postpartum depression   * lower risk for breast, uterine, and ovarian cancers   * lower risk for osteoporosis developing with age   * easier than formula - is always right with you, clean, and the right temperature   * less expensive than formula……it’s FREE !!!!     KEYS TO SUCCESSFUL BREASTFEEDING   * keep baby skin-to-skin until after first feeding event   * keep baby in your room with you during your hospital stay after delivery   * avoid any bottle feedings (unless medically necessary)   * limit the use of pacifiers and swaddling   * ask for help if you are having any issues……lactation consultants (who specialize in breastfeeding) are available to help you   * a healthy diet for mom……eating a variety of foods and portions in moderation    THINGS YOU SHOULD KNOW ABOUT BREASTFEEDING   * most medications are considered compatible with breastfeeding by the American Academy of Pediatrics, but you should check with your health care provider or lactation consultant prior to taking a new medication……just to be sure it is safe   * alcohol (beer, wine, liquor) can be passed from mother to baby through breast milk……an occasional, social drink is deemed acceptable by the American Academy of Pediatrics…..more than that should be avoided   * breastfeeding is NOT an  effective method of birth control   * nicotine (in cigarettes) can pass from mother to baby through breast milk…..however, for mothers who smoke, it is still healthier to breastfeed than use formula   * caffeine should be limited to 1-3 cups per day……includes coffee, soda, energy drinks         PERINEAL / VAGINAL MASSAGE    What can I do now to decrease my chances of tearing during delivery?  Massaging around the vaginal opening by you (or your partner), either antepartum (before birth) or during the second stage of labor, can reduce the likelihood of perineal tearing during childbirth.  Likewise, the use of warm packs held on the perineum during the pushing stage of labor can reduce the severity of your tear.  This will happen during the pushing stage of labor.  At home, you can also help reduce the chances of injury that may occur during the birth of your child through perineal massage.    When should I do this?  Starting around or shortly after 34 weeks of pregnancy, you or your partner should provide 5-10 minutes of vaginal massage 1-4 times per week.    How?  Use either almond, coconut, or olive oil and water mixture on 1 or 2 fingers (depending on comfort).  Insert finger(s) 3-5cm into the vagina.  Apply sweeping downward/sideward pressure from 3 to 9 o'clock for 5-10 minutes, 1-4 times per week.          WARNING SIGNS DURING PREGNANCY  Call our office at [phone] if you experience any of the followin. Vaginal bleeding  2. Sharp abdominal pain that does not go away  3. Fever (more than 100.4 and is not relieved by Tylenol)  4. Persistent vomiting lasting greater than 24 hours  5. Chest pain   6. Pain or burning when you urinate  7. Severe headache that doesn't resolve with Tylenol  8. Blurred vision or seeing spots in your vision  9. Sudden swelling of your face or hands  10. Redness, swelling or pain in a leg  11. A sudden weight gain in just a few days  12. Decrease in your baby's movement (after  28 weeks or the 6th month of pregnancy)  13. A loss of watery fluid from your vagina - can be a gush, a trickle or continuous wetness  14. After 20 weeks of pregnancy, rhythmic cramping (greater than 4 per hour) or menstrual like low/pelvic pain          VACCINES IN PREGNANCY    TDAP  Whooping cough (or pertussis) can be serious for anyone, but for your , it can be life-threatning.  Up to 20 babies die each year in the [place] Due to whooping cough.  About half of babies younger than 1 year old who get whooping cough need treatment in the hospital.  The younger the baby is when he or she gets whooping cough, the more likely he or she will need to be treated in a hospital.  When you receive the whooping cough vaccine (Tdap) during your pregnancy, your body will create protective antibodies and pass some of them to your baby before birth.  These antibodies can help protect your baby from getting whooping cough until they are old enough to be vaccinated themselves (usually around 6 months of age).    INFLUENZA  Changes in your immune, heart, and lung functions during pregnancy make you more likely to get seriously ill from the flu.  Catching the flu also increases your chances for serious problems for your developing baby, including premature labor and delivery.  It is recommended that all women who are pregnant during flu season should receive an influenza vaccine.

## 2023-12-28 ENCOUNTER — CLINICAL SUPPORT (OUTPATIENT)
Dept: POSTPARTUM | Facility: CLINIC | Age: 23
End: 2023-12-28

## 2023-12-28 DIAGNOSIS — Z32.2 ENCOUNTER FOR CHILDBIRTH INSTRUCTION: Primary | ICD-10-CM

## 2024-01-02 ENCOUNTER — CLINICAL SUPPORT (OUTPATIENT)
Dept: POSTPARTUM | Facility: CLINIC | Age: 24
End: 2024-01-02

## 2024-01-02 DIAGNOSIS — Z32.2 ENCOUNTER FOR CHILDBIRTH INSTRUCTION: Primary | ICD-10-CM

## 2024-01-03 ENCOUNTER — CLINICAL SUPPORT (OUTPATIENT)
Dept: POSTPARTUM | Facility: CLINIC | Age: 24
End: 2024-01-03

## 2024-01-03 DIAGNOSIS — Z32.2 ENCOUNTER FOR CHILDBIRTH INSTRUCTION: Primary | ICD-10-CM

## 2024-01-04 ENCOUNTER — ROUTINE PRENATAL (OUTPATIENT)
Dept: OBGYN CLINIC | Facility: CLINIC | Age: 24
End: 2024-01-04

## 2024-01-04 VITALS — BODY MASS INDEX: 40.32 KG/M2 | WEIGHT: 281 LBS | DIASTOLIC BLOOD PRESSURE: 80 MMHG | SYSTOLIC BLOOD PRESSURE: 132 MMHG

## 2024-01-04 DIAGNOSIS — O99.213 OTHER OBESITY DUE TO EXCESS CALORIES AFFECTING PREGNANCY IN THIRD TRIMESTER: Primary | ICD-10-CM

## 2024-01-04 DIAGNOSIS — Z3A.30 30 WEEKS GESTATION OF PREGNANCY: ICD-10-CM

## 2024-01-04 DIAGNOSIS — E66.09 OTHER OBESITY DUE TO EXCESS CALORIES AFFECTING PREGNANCY IN THIRD TRIMESTER: Primary | ICD-10-CM

## 2024-01-04 PROCEDURE — PNV: Performed by: OBSTETRICS & GYNECOLOGY

## 2024-01-04 NOTE — PROGRESS NOTES
23 y.o.  female at 30w1d (Estimated Date of Delivery: 3/13/24) for PNV.    Pre-Sharon Vitals      Flowsheet Row Most Recent Value   Prenatal Assessment    Fetal Heart Rate 150   Movement Present   Prenatal Vitals    Blood Pressure 132/80   Weight - Scale 127 kg (281 lb)   Urine Albumin/Glucose    Dilation/Effacement/Station    Vaginal Drainage    Edema           TW.258 kg (16 lb)    Leakage of fluid: no  Vaginal bleeding: no  Contractions/Cramping: no  Fetal movement: yes  Working with  through [facility].   RTO in 2 weeks.

## 2024-01-10 ENCOUNTER — CLINICAL SUPPORT (OUTPATIENT)
Dept: POSTPARTUM | Facility: CLINIC | Age: 24
End: 2024-01-10

## 2024-01-10 DIAGNOSIS — Z32.2 ENCOUNTER FOR CHILDBIRTH INSTRUCTION: Primary | ICD-10-CM

## 2024-01-17 ENCOUNTER — CLINICAL SUPPORT (OUTPATIENT)
Dept: POSTPARTUM | Facility: CLINIC | Age: 24
End: 2024-01-17

## 2024-01-17 DIAGNOSIS — Z32.2 ENCOUNTER FOR CHILDBIRTH INSTRUCTION: Primary | ICD-10-CM

## 2024-01-18 ENCOUNTER — ULTRASOUND (OUTPATIENT)
Dept: PERINATAL CARE | Facility: OTHER | Age: 24
End: 2024-01-18
Payer: COMMERCIAL

## 2024-01-18 VITALS
DIASTOLIC BLOOD PRESSURE: 70 MMHG | WEIGHT: 283.6 LBS | HEART RATE: 93 BPM | SYSTOLIC BLOOD PRESSURE: 128 MMHG | BODY MASS INDEX: 40.6 KG/M2 | HEIGHT: 70 IN

## 2024-01-18 DIAGNOSIS — Z3A.32 32 WEEKS GESTATION OF PREGNANCY: ICD-10-CM

## 2024-01-18 DIAGNOSIS — O99.213 OBESITY AFFECTING PREGNANCY IN THIRD TRIMESTER, UNSPECIFIED OBESITY TYPE: Primary | ICD-10-CM

## 2024-01-18 DIAGNOSIS — Z36.89 ENCOUNTER FOR ULTRASOUND TO ASSESS FETAL GROWTH: ICD-10-CM

## 2024-01-18 DIAGNOSIS — Z36.2 ENCOUNTER FOR FOLLOW-UP ULTRASOUND OF FETAL ANATOMY: ICD-10-CM

## 2024-01-18 PROCEDURE — 99213 OFFICE O/P EST LOW 20 MIN: CPT | Performed by: OBSTETRICS & GYNECOLOGY

## 2024-01-18 PROCEDURE — 76816 OB US FOLLOW-UP PER FETUS: CPT | Performed by: OBSTETRICS & GYNECOLOGY

## 2024-01-18 NOTE — LETTER
"2024     Renay Suazo MD  [address]  [address]  [facility] [address]    Patient: Elizabeth Camacho   YOB: 2000   Date of Visit: 2024       Dear Dr. Suazo:    She needs weekly NST+NEGIN at 37 weeks based on pre-gravid BMI. She prefers to have in your office, I asked her to discuss scheduling at her visit with you tomorrow. If she needs to have with MFM she should call to schedule, we didn't schedule anything for follow-up at this time. Thanks.     Sincerely,        Julia Paz MD        CC: No Recipients    Julia Paz MD  2024 10:04 AM  Sign when Signing Visit  [facility]: Elizabeth Camacho was seen today at 32w1d for fetal growth and followup missed anatomy ultrasound.  See ultrasound report under \"OB Procedures\" tab.  Please don't hesitate to contact our office with any concerns or questions.  -Julia Paz MD    "

## 2024-01-18 NOTE — PATIENT INSTRUCTIONS
"Please discontinue low dose aspirin at 36 weeks gestation.     Please refer to \" Ultrasound\" under test results in MyChart for today's ultrasound findings. Congratulations, and best wishes for a healthy remainder of the pregnancy!    Thank you for choosing [facility] for your visit today. We appreciate your trust and the opportunity to assist your obstetrician with your care. We value your feedback regarding the care we are providing. Following today's appointment, you may receive a patient satisfaction survey by mail or e-mail requesting feedback on your visit. We ask that you complete the survey to  help us understand how we are doing. Thank you for in advance for your feedback.    "

## 2024-01-18 NOTE — PROGRESS NOTES
"[facility]: Elizabeth Camacho was seen today at 32w1d for fetal growth and followup missed anatomy ultrasound.  See ultrasound report under \"OB Procedures\" tab.  Please don't hesitate to contact our office with any concerns or questions.  -Julia Paz MD    "

## 2024-01-19 ENCOUNTER — ROUTINE PRENATAL (OUTPATIENT)
Dept: OBGYN CLINIC | Facility: CLINIC | Age: 24
End: 2024-01-19

## 2024-01-19 VITALS — BODY MASS INDEX: 40.75 KG/M2 | DIASTOLIC BLOOD PRESSURE: 74 MMHG | WEIGHT: 284 LBS | SYSTOLIC BLOOD PRESSURE: 118 MMHG

## 2024-01-19 DIAGNOSIS — E66.09 OTHER OBESITY DUE TO EXCESS CALORIES AFFECTING PREGNANCY IN THIRD TRIMESTER: Primary | ICD-10-CM

## 2024-01-19 DIAGNOSIS — O99.213 OTHER OBESITY DUE TO EXCESS CALORIES AFFECTING PREGNANCY IN THIRD TRIMESTER: Primary | ICD-10-CM

## 2024-01-19 DIAGNOSIS — Z3A.32 32 WEEKS GESTATION OF PREGNANCY: ICD-10-CM

## 2024-01-19 PROCEDURE — PNV: Performed by: OBSTETRICS & GYNECOLOGY

## 2024-01-19 NOTE — PROGRESS NOTES
23 y.o.  female at 32w2d (Estimated Date of Delivery: 3/13/24) for PNV.    Pre-Sharon Vitals      Flowsheet Row Most Recent Value   Prenatal Assessment    Fetal Heart Rate 150   Fundal Height (cm) 32 cm   Movement Present   Prenatal Vitals    Blood Pressure 118/74   Weight - Scale 129 kg (284 lb)   Urine Albumin/Glucose    Dilation/Effacement/Station    Vaginal Drainage    Edema           TW.618 kg (19 lb)    Leakage of fluid: no  Vaginal bleeding: no  Contractions/Cramping: no  Fetal movement: yes  Pregravid BMI >35 - NSTs weekly starting at 37wks.   Breast pump form signed for patient.   RTO in 2 weeks.

## 2024-01-19 NOTE — PROGRESS NOTES
Elizabeth Camacho is [id number], here for her routine ob appt;  pt would like to talk about scheduling NST, per MFM.    CTX/cramping:no  LOF:  no  VB/spotting:  no    +FM:  yes

## 2024-02-01 ENCOUNTER — LAB (OUTPATIENT)
Dept: LAB | Facility: CLINIC | Age: 24
End: 2024-02-01
Payer: COMMERCIAL

## 2024-02-01 ENCOUNTER — ROUTINE PRENATAL (OUTPATIENT)
Dept: OBGYN CLINIC | Facility: CLINIC | Age: 24
End: 2024-02-01

## 2024-02-01 VITALS — DIASTOLIC BLOOD PRESSURE: 80 MMHG | BODY MASS INDEX: 40.89 KG/M2 | SYSTOLIC BLOOD PRESSURE: 144 MMHG | WEIGHT: 285 LBS

## 2024-02-01 DIAGNOSIS — O16.3 ELEVATED BLOOD PRESSURE COMPLICATING PREGNANCY, ANTEPARTUM, THIRD TRIMESTER: ICD-10-CM

## 2024-02-01 DIAGNOSIS — Z3A.34 34 WEEKS GESTATION OF PREGNANCY: ICD-10-CM

## 2024-02-01 DIAGNOSIS — O99.213 OTHER OBESITY DUE TO EXCESS CALORIES AFFECTING PREGNANCY IN THIRD TRIMESTER: ICD-10-CM

## 2024-02-01 DIAGNOSIS — O16.3 ELEVATED BLOOD PRESSURE COMPLICATING PREGNANCY, ANTEPARTUM, THIRD TRIMESTER: Primary | ICD-10-CM

## 2024-02-01 DIAGNOSIS — E66.09 OTHER OBESITY DUE TO EXCESS CALORIES AFFECTING PREGNANCY IN THIRD TRIMESTER: ICD-10-CM

## 2024-02-01 LAB
ALBUMIN SERPL BCP-MCNC: 3.6 G/DL (ref 3.5–5)
ALP SERPL-CCNC: 102 U/L (ref 34–104)
ALT SERPL W P-5'-P-CCNC: 9 U/L (ref 7–52)
ANION GAP SERPL CALCULATED.3IONS-SCNC: 9 MMOL/L
AST SERPL W P-5'-P-CCNC: 16 U/L (ref 13–39)
BILIRUB SERPL-MCNC: 0.24 MG/DL (ref 0.2–1)
BUN SERPL-MCNC: 7 MG/DL (ref 5–25)
CALCIUM SERPL-MCNC: 9.1 MG/DL (ref 8.4–10.2)
CHLORIDE SERPL-SCNC: 103 MMOL/L (ref 96–108)
CO2 SERPL-SCNC: 25 MMOL/L (ref 21–32)
CREAT SERPL-MCNC: 0.64 MG/DL (ref 0.6–1.3)
CREAT UR-MCNC: 123.2 MG/DL
ERYTHROCYTE [DISTWIDTH] IN BLOOD BY AUTOMATED COUNT: 14.3 % (ref 11.6–15.1)
GFR SERPL CREATININE-BSD FRML MDRD: 124 ML/MIN/1.73SQ M
GLUCOSE P FAST SERPL-MCNC: 75 MG/DL (ref 65–99)
HCT VFR BLD AUTO: 40.3 % (ref 34.8–46.1)
HGB BLD-MCNC: 13.3 G/DL (ref 11.5–15.4)
MCH RBC QN AUTO: 30 PG (ref 26.8–34.3)
MCHC RBC AUTO-ENTMCNC: 33 G/DL (ref 31.4–37.4)
MCV RBC AUTO: 91 FL (ref 82–98)
PLATELET # BLD AUTO: 261 THOUSANDS/UL (ref 149–390)
PMV BLD AUTO: 11 FL (ref 8.9–12.7)
POTASSIUM SERPL-SCNC: 3.8 MMOL/L (ref 3.5–5.3)
PROT SERPL-MCNC: 7.2 G/DL (ref 6.4–8.4)
PROT UR-MCNC: 15 MG/DL
PROT/CREAT UR: 0.12 MG/G{CREAT} (ref 0–0.1)
RBC # BLD AUTO: 4.44 MILLION/UL (ref 3.81–5.12)
SODIUM SERPL-SCNC: 137 MMOL/L (ref 135–147)
WBC # BLD AUTO: 9.07 THOUSAND/UL (ref 4.31–10.16)

## 2024-02-01 PROCEDURE — 36415 COLL VENOUS BLD VENIPUNCTURE: CPT

## 2024-02-01 PROCEDURE — 80053 COMPREHEN METABOLIC PANEL: CPT

## 2024-02-01 PROCEDURE — 85027 COMPLETE CBC AUTOMATED: CPT

## 2024-02-01 PROCEDURE — 82570 ASSAY OF URINE CREATININE: CPT

## 2024-02-01 PROCEDURE — PNV: Performed by: OBSTETRICS & GYNECOLOGY

## 2024-02-01 PROCEDURE — 84156 ASSAY OF PROTEIN URINE: CPT

## 2024-02-01 NOTE — PROGRESS NOTES
23 y.o.  female at 34w1d (Estimated Date of Delivery: 3/13/24) for PNV.    Pre-Sharon Vitals      Flowsheet Row Most Recent Value   Prenatal Assessment    Fetal Heart Rate 150   Movement Present   Prenatal Vitals    Blood Pressure 144/80   Weight - Scale 129 kg (285 lb)   Urine Albumin/Glucose    Dilation/Effacement/Station    Vaginal Drainage    Edema           TW.072 kg (20 lb)    Leakage of fluid: no  Vaginal bleeding: no  Contractions/Cramping: no  Fetal movement: yes  Elevated BP in office - preeclampsia labs ordered for patient.    - non-severe BP, 144/80. Asymptomatic. Discussed elevated BP and need for laboratory evaluation for preeclampsia. Reviewed signs and symptoms of preeclampsia. Discussed that if elevated BP is persistent should anticipate IOL at 37 weeks for gestational HTN and weekly labs to monitor for any development of preeclampsia.   RTO in 2 weeks.

## 2024-02-01 NOTE — PROGRESS NOTES
23 y.o.  female at 34w1d (Estimated Date of Delivery: 3/13/24) for PNV.    Pre-Sharon Vitals      Flowsheet Row Most Recent Value   Prenatal Assessment    Movement Present   Prenatal Vitals    Weight - Scale 129 kg (285 lb)   Urine Albumin/Glucose    Dilation/Effacement/Station    Vaginal Drainage    Edema           TW.072 kg (20 lb)    Leakage of fluid: {YES/NO:50162}  Vaginal bleeding: {YES/NO:01735}  Contractions/Cramping: {YES/NO:47210}  Fetal movement: {YES/NO:65249}    RTO in {#:55859} weeks.

## 2024-02-02 NOTE — RESULT ENCOUNTER NOTE
Normal preE labs. Elevated BP in office for prenatal visit. Continue to monitor closely for gHTN dx and development of preE.

## 2024-02-16 ENCOUNTER — ROUTINE PRENATAL (OUTPATIENT)
Dept: OBGYN CLINIC | Facility: CLINIC | Age: 24
End: 2024-02-16

## 2024-02-16 VITALS — DIASTOLIC BLOOD PRESSURE: 84 MMHG | WEIGHT: 290 LBS | SYSTOLIC BLOOD PRESSURE: 132 MMHG | BODY MASS INDEX: 41.61 KG/M2

## 2024-02-16 DIAGNOSIS — O16.3 ELEVATED BLOOD PRESSURE COMPLICATING PREGNANCY, ANTEPARTUM, THIRD TRIMESTER: Primary | ICD-10-CM

## 2024-02-16 DIAGNOSIS — Z3A.36 36 WEEKS GESTATION OF PREGNANCY: ICD-10-CM

## 2024-02-16 PROCEDURE — 87150 DNA/RNA AMPLIFIED PROBE: CPT | Performed by: OBSTETRICS & GYNECOLOGY

## 2024-02-16 PROCEDURE — PNV: Performed by: OBSTETRICS & GYNECOLOGY

## 2024-02-16 PROCEDURE — 87591 N.GONORRHOEAE DNA AMP PROB: CPT | Performed by: OBSTETRICS & GYNECOLOGY

## 2024-02-16 PROCEDURE — 87491 CHLMYD TRACH DNA AMP PROBE: CPT | Performed by: OBSTETRICS & GYNECOLOGY

## 2024-02-16 NOTE — PROGRESS NOTES
23 y.o.  female at 36w2d (Estimated Date of Delivery: 3/13/24) for PNV.    Pre-Sharon Vitals      Flowsheet Row Most Recent Value   Prenatal Assessment    Fetal Heart Rate 150   Movement Present   Prenatal Vitals    Blood Pressure 132/84   Weight - Scale 132 kg (290 lb)   Urine Albumin/Glucose    Dilation/Effacement/Station    Cervical Dilation 1   Cervical Effacement 0   Fetal Station -4   Vaginal Drainage    Edema           TW.3 kg (25 lb)    Leakage of fluid: no  Vaginal bleeding: no  Contractions/Cramping: no  Fetal movement: yes    GBS done: Yes  PCN allergy: No  Chlamydia/gonorrhea swab done: Yes    Elevated BP at prior prenatal visit -- HELLP labs wnl.    - normotensive BP today. Discussed with patient that if elevated BP occurs again at a future prenatal visit, recommendation will be for induction of labor due to diagnosis of gestational hypertension. Discussed different methods of IOL including a high likelihood of needing cervical ripening with victoria balloon and cytotec. Reviewed recommendation for AROM to closely follow victoria balloon expulsion to continue progress of moving toward active labor.     Labor precautions given.  FKC reviewed.     RTO in 1 weeks.

## 2024-02-16 NOTE — PROGRESS NOTES
Elizabeth Janice is [id number], here for her routine ob appt; GBS, GC/c cultures ordered and to be collected today. Pt is having some occasional cramping, however denies any lof, vb/spotting.

## 2024-02-17 LAB
C TRACH DNA SPEC QL NAA+PROBE: NEGATIVE
N GONORRHOEA DNA SPEC QL NAA+PROBE: NEGATIVE

## 2024-02-18 LAB — GP B STREP DNA SPEC QL NAA+PROBE: POSITIVE

## 2024-02-19 ENCOUNTER — ROUTINE PRENATAL (OUTPATIENT)
Dept: OBGYN CLINIC | Facility: CLINIC | Age: 24
End: 2024-02-19

## 2024-02-19 VITALS — WEIGHT: 290 LBS | DIASTOLIC BLOOD PRESSURE: 84 MMHG | BODY MASS INDEX: 41.61 KG/M2 | SYSTOLIC BLOOD PRESSURE: 124 MMHG

## 2024-02-19 DIAGNOSIS — O99.213 OTHER OBESITY AFFECTING PREGNANCY IN THIRD TRIMESTER: Primary | ICD-10-CM

## 2024-02-19 DIAGNOSIS — E66.8 OTHER OBESITY AFFECTING PREGNANCY IN THIRD TRIMESTER: Primary | ICD-10-CM

## 2024-02-19 DIAGNOSIS — Z3A.36 36 WEEKS GESTATION OF PREGNANCY: ICD-10-CM

## 2024-02-19 PROCEDURE — PNV: Performed by: OBSTETRICS & GYNECOLOGY

## 2024-02-19 NOTE — PATIENT INSTRUCTIONS
Am I in labour?    As you enter the final stages of your pregnancy, your body will give signs that labour is approaching. The following information should help you to understand these signs and make it easier for you to determine whether you are in labour.    Some of the signs and symptoms of going into labour may include:    period-like cramps  backache  diarrhoea  mucous discharge or ‘show’  gush or trickle of water as the membranes break  contractions  Engagement  As you move closer to delivery, your baby’s head may drop and become engaged in your pelvis in preparation for labour. If you are expecting your first baby, you may notice pressure in your groin and on your bladder beginning up to four weeks before the birth. You may also notice that you can breathe a little easier and have a little more appetite as your baby drops, and is not pushed up against your diaphragm and stomach quite so much. This is sometimes known as “lightening”, as women generally feel lighter.    Show    During your pregnancy a mucous plug fills your cervix. Towards the end of pregnancy, the cervix becomes softer and this mucous plug may become loosened and start to come away. The process of discharging this mucous is called a ‘show’ and might often contain streaks of blood or may also be brownish in colour. This is different from any flow of fresh blood which you would report immediately to your doctor or the hospital. The show may continue over a period of hours or even days. It is one of the signs that your body is preparing for birth. Labour may begin in the next few days, hours or weeks following a show. There is no need to phone the hospital if you have had a show.    Water breaking (rupture of membranes)    This may occur at any time prior to the start of labour, or at any time during labour. The break may be low, near the opening of the uterus, and will produce a gush of amniotic fluid. If this occurs, place a sanitary pad on and  "note the colour of the fluid. Ring the hospital and tell the midwife that this has occurred. You will usually be asked to come in to the hospital.    Another type of amniotic fluid leak may occur higher up in the amniotic sack, or top of the uterus. This will be less obvious to you and you may only notice a trickle of fluid. Since many women have a heavy vaginal discharge or leak a small amount of urine towards the end of their pregnancy and it is often difficult to tell the difference between urine and amniotic fluid - urine is often yellow; where amniotic fluid is usually clear, or has a pink tinge, and has a \"sweet\" odour. If you are unsure, ring the hospital.    If the colour of the fluid is green or brownish, it indicates that your baby has passed a bowel motion (meconium) inside the uterus. It is very common to have meconium-stained amniotic fluid in a pregnancy over 41 weeks, but this may also be a sign that your baby is distressed. You will need to ring the hospital immediately and then come into the hospital.    Contractions    Roche[place] Gaspar contractions  Roche[place] Gaspar contractions are sometimes mistaken for labour. These “practice” contractions usually start assisted through the pregnancy and continue right through to the end. These contractions are often irregular and can be uncomfortable and tight. Quan Gaspar contractions usually increase in regularity and strength towards the end of your pregnancy, preparing your uterus for the birth. Sometimes it is difficult to distinguish between these Quan Gaspar contractions and labour contractions. Below are the common differences between the two.    Labour contractions  True labour contractions usually increase in strength and duration. In order to time your contractions, time the interval between the start of one contraction to the start of the next. Early labour contractions are often likened to period cramps with or without backache.    Quan Gaspar " contractions    Labour contractions    usually irregular and short    become regular with time    do not get closer together    get closer together with time    do not get stronger     become stronger    walking does not make them stronger  walking can make them stronger    lying down may make them go away   lying down does not make them go away    uncomfortable and tight--not painful   Painful - can't walk, talk or breathe through them        How does labour start?    Labour can start in different ways. You may be start experiencing some period like pains or contractions. You might notice that these tightenings/contractions start to get stronger, closer and last longer than before. Or you might start with some back ache or a stomach upset that gets stronger and develops into regular contractions. In approximately 10-15% of women, labour will start when your membranes rupture. Contractions usually follow.    Should I call my doctor?    You should call your doctor at [phone] when:    - your lopes break  - you have bright blood loss  - your contractions are regular and five minutes apart  - if you have decreased fetal movement

## 2024-02-19 NOTE — PROGRESS NOTES
23 y.o.   female at 36.5 wga for PNV. BP : 124/84. TW  + FM, - LOF, - Ctxn, - bleeding  Feeling well.  No complaints  GBS neg  Desires spontaneous labor and hoping not to have an epidural  If she reaches 40 wks she would like an IOL  Reviewed labor precautions and Cs  NST wkly for BMI starting at 37 weeks  F/u 1 week

## 2024-02-28 ENCOUNTER — ANESTHESIA (INPATIENT)
Dept: ANESTHESIOLOGY | Facility: HOSPITAL | Age: 24
End: 2024-02-28
Payer: COMMERCIAL

## 2024-02-28 ENCOUNTER — ANESTHESIA EVENT (INPATIENT)
Dept: ANESTHESIOLOGY | Facility: HOSPITAL | Age: 24
End: 2024-02-28
Payer: COMMERCIAL

## 2024-02-28 ENCOUNTER — HOSPITAL ENCOUNTER (INPATIENT)
Facility: HOSPITAL | Age: 24
LOS: 2 days | Discharge: HOME | End: 2024-03-01
Attending: OBSTETRICS & GYNECOLOGY | Admitting: OBSTETRICS & GYNECOLOGY
Payer: COMMERCIAL

## 2024-02-28 ENCOUNTER — ROUTINE PRENATAL (OUTPATIENT)
Dept: OBGYN CLINIC | Facility: CLINIC | Age: 24
End: 2024-02-28
Payer: COMMERCIAL

## 2024-02-28 VITALS — BODY MASS INDEX: 42.47 KG/M2 | WEIGHT: 293 LBS | SYSTOLIC BLOOD PRESSURE: 130 MMHG | DIASTOLIC BLOOD PRESSURE: 78 MMHG

## 2024-02-28 DIAGNOSIS — O41.03X0 OLIGOHYDRAMNIOS IN THIRD TRIMESTER, SINGLE OR UNSPECIFIED FETUS: ICD-10-CM

## 2024-02-28 DIAGNOSIS — E66.09 OTHER OBESITY DUE TO EXCESS CALORIES AFFECTING PREGNANCY IN THIRD TRIMESTER: ICD-10-CM

## 2024-02-28 DIAGNOSIS — Z3A.38 38 WEEKS GESTATION OF PREGNANCY: Primary | ICD-10-CM

## 2024-02-28 DIAGNOSIS — O99.213 OTHER OBESITY DUE TO EXCESS CALORIES AFFECTING PREGNANCY IN THIRD TRIMESTER: ICD-10-CM

## 2024-02-28 PROBLEM — O13.9 GESTATIONAL HYPERTENSION: Status: ACTIVE | Noted: 2024-02-28

## 2024-02-28 PROBLEM — Z34.90 ENCOUNTER FOR INDUCTION OF LABOR: Status: ACTIVE | Noted: 2024-02-28

## 2024-02-28 PROBLEM — O09.899 SUSCEPTIBLE TO VARICELLA (NON-IMMUNE), CURRENTLY PREGNANT: Status: ACTIVE | Noted: 2024-02-28

## 2024-02-28 PROBLEM — Z28.39 SUSCEPTIBLE TO VARICELLA (NON-IMMUNE), CURRENTLY PREGNANT: Status: ACTIVE | Noted: 2024-02-28

## 2024-02-28 LAB
ABO GROUP BLD: NORMAL
ALBUMIN SERPL BCP-MCNC: 3.5 G/DL (ref 3.5–5)
ALP SERPL-CCNC: 124 U/L (ref 34–104)
ALT SERPL W P-5'-P-CCNC: 14 U/L (ref 7–52)
ANION GAP SERPL CALCULATED.3IONS-SCNC: 9 MMOL/L
AST SERPL W P-5'-P-CCNC: 20 U/L (ref 13–39)
BILIRUB SERPL-MCNC: 0.28 MG/DL (ref 0.2–1)
BLD GP AB SCN SERPL QL: NEGATIVE
BUN SERPL-MCNC: 5 MG/DL (ref 5–25)
CALCIUM SERPL-MCNC: 8.7 MG/DL (ref 8.4–10.2)
CHLORIDE SERPL-SCNC: 106 MMOL/L (ref 96–108)
CO2 SERPL-SCNC: 21 MMOL/L (ref 21–32)
CREAT SERPL-MCNC: 0.59 MG/DL (ref 0.6–1.3)
CREAT UR-MCNC: 144.8 MG/DL
ERYTHROCYTE [DISTWIDTH] IN BLOOD BY AUTOMATED COUNT: 14.6 % (ref 11.6–15.1)
GFR SERPL CREATININE-BSD FRML MDRD: 127 ML/MIN/1.73SQ M
GLUCOSE SERPL-MCNC: 78 MG/DL (ref 65–140)
HCT VFR BLD AUTO: 38.7 % (ref 34.8–46.1)
HGB BLD-MCNC: 12.9 G/DL (ref 11.5–15.4)
HOLD SPECIMEN: NORMAL
MCH RBC QN AUTO: 29.6 PG (ref 26.8–34.3)
MCHC RBC AUTO-ENTMCNC: 33.3 G/DL (ref 31.4–37.4)
MCV RBC AUTO: 89 FL (ref 82–98)
PLATELET # BLD AUTO: 226 THOUSANDS/UL (ref 149–390)
PMV BLD AUTO: 10.6 FL (ref 8.9–12.7)
POTASSIUM SERPL-SCNC: 3.9 MMOL/L (ref 3.5–5.3)
PROT SERPL-MCNC: 6.7 G/DL (ref 6.4–8.4)
PROT UR-MCNC: 21 MG/DL
PROT/CREAT UR: 0.15 MG/G{CREAT} (ref 0–0.1)
RBC # BLD AUTO: 4.36 MILLION/UL (ref 3.81–5.12)
RH BLD: POSITIVE
SODIUM SERPL-SCNC: 136 MMOL/L (ref 135–147)
SPECIMEN EXPIRATION DATE: NORMAL
TREPONEMA PALLIDUM IGG+IGM AB [PRESENCE] IN SERUM OR PLASMA BY IMMUNOASSAY: NORMAL
WBC # BLD AUTO: 7.7 THOUSAND/UL (ref 4.31–10.16)

## 2024-02-28 PROCEDURE — 86900 BLOOD TYPING SEROLOGIC ABO: CPT

## 2024-02-28 PROCEDURE — 59025 FETAL NON-STRESS TEST: CPT | Performed by: OBSTETRICS & GYNECOLOGY

## 2024-02-28 PROCEDURE — NC001 PR NO CHARGE: Performed by: OBSTETRICS & GYNECOLOGY

## 2024-02-28 PROCEDURE — 86780 TREPONEMA PALLIDUM: CPT

## 2024-02-28 PROCEDURE — 82570 ASSAY OF URINE CREATININE: CPT

## 2024-02-28 PROCEDURE — 80053 COMPREHEN METABOLIC PANEL: CPT

## 2024-02-28 PROCEDURE — PNV: Performed by: OBSTETRICS & GYNECOLOGY

## 2024-02-28 PROCEDURE — 10907ZC DRAINAGE OF AMNIOTIC FLUID, THERAPEUTIC FROM PRODUCTS OF CONCEPTION, VIA NATURAL OR ARTIFICIAL OPENING: ICD-10-PCS | Performed by: OBSTETRICS & GYNECOLOGY

## 2024-02-28 PROCEDURE — 3E033VJ INTRODUCTION OF OTHER HORMONE INTO PERIPHERAL VEIN, PERCUTANEOUS APPROACH: ICD-10-PCS | Performed by: OBSTETRICS & GYNECOLOGY

## 2024-02-28 PROCEDURE — 86850 RBC ANTIBODY SCREEN: CPT

## 2024-02-28 PROCEDURE — 84156 ASSAY OF PROTEIN URINE: CPT

## 2024-02-28 PROCEDURE — 86901 BLOOD TYPING SEROLOGIC RH(D): CPT

## 2024-02-28 PROCEDURE — 76815 OB US LIMITED FETUS(S): CPT | Performed by: OBSTETRICS & GYNECOLOGY

## 2024-02-28 PROCEDURE — 0HQ9XZZ REPAIR PERINEUM SKIN, EXTERNAL APPROACH: ICD-10-PCS | Performed by: OBSTETRICS & GYNECOLOGY

## 2024-02-28 PROCEDURE — 4A1HXCZ MONITORING OF PRODUCTS OF CONCEPTION, CARDIAC RATE, EXTERNAL APPROACH: ICD-10-PCS | Performed by: OBSTETRICS & GYNECOLOGY

## 2024-02-28 PROCEDURE — 85027 COMPLETE CBC AUTOMATED: CPT

## 2024-02-28 RX ORDER — SODIUM CHLORIDE, SODIUM LACTATE, POTASSIUM CHLORIDE, CALCIUM CHLORIDE 600; 310; 30; 20 MG/100ML; MG/100ML; MG/100ML; MG/100ML
125 INJECTION, SOLUTION INTRAVENOUS CONTINUOUS
Status: DISCONTINUED | OUTPATIENT
Start: 2024-02-28 | End: 2024-02-29

## 2024-02-28 RX ORDER — BUPIVACAINE HYDROCHLORIDE 2.5 MG/ML
30 INJECTION, SOLUTION EPIDURAL; INFILTRATION; INTRACAUDAL ONCE AS NEEDED
Status: DISCONTINUED | OUTPATIENT
Start: 2024-02-28 | End: 2024-02-29

## 2024-02-28 RX ORDER — OXYTOCIN/RINGER'S LACTATE 30/500 ML
1-30 PLASTIC BAG, INJECTION (ML) INTRAVENOUS
Status: DISCONTINUED | OUTPATIENT
Start: 2024-02-28 | End: 2024-02-29

## 2024-02-28 RX ADMIN — SODIUM CHLORIDE, SODIUM LACTATE, POTASSIUM CHLORIDE, AND CALCIUM CHLORIDE 125 ML/HR: .6; .31; .03; .02 INJECTION, SOLUTION INTRAVENOUS at 22:00

## 2024-02-28 RX ADMIN — Medication 2 MILLI-UNITS/MIN: at 16:44

## 2024-02-28 RX ADMIN — PENICILLIN G POTASSIUM 5 MILLION UNITS: 20000000 INJECTION, POWDER, FOR SOLUTION INTRAVENOUS at 16:44

## 2024-02-28 RX ADMIN — SODIUM CHLORIDE, SODIUM LACTATE, POTASSIUM CHLORIDE, AND CALCIUM CHLORIDE 125 ML/HR: .6; .31; .03; .02 INJECTION, SOLUTION INTRAVENOUS at 13:27

## 2024-02-28 RX ADMIN — PENICILLIN G POTASSIUM 2.5 MILLION UNITS: 20000000 INJECTION, POWDER, FOR SOLUTION INTRAVENOUS at 20:15

## 2024-02-28 NOTE — OB LABOR/OXYTOCIN SAFETY PROGRESS
Labor Progress Note - Elizabeth Camacho 23 y.o. female MRN: [id number]    Unit/Bed#: -01 Encounter: [id number]       Contraction Frequency (minutes): 1-4  Contraction Intensity: Mild  Uterine Activity Characteristics: Irritability  Cervical Dilation: 1-2        Cervical Effacement: 60  Fetal Station: -2  Baseline Rate (FHR): 150 bpm  Fetal Heart Rate (FHT): 162 BPM  FHR Category: 1               Vital Signs:   Vitals:    02/28/24 1358   BP: 133/78   Pulse: 103   Resp:    Temp:        Notes/comments:   Jordan balloon placed for cervical ripening. Speculum exam was performed to confirm lack of herpetic lesions vaginally. No lesions visualized and patient is able to labor and deliver vaginally.   Jordan balloon was placed blindly and 60mL of sterile fluid was used to insufflate the balloon above the internal os of the cervix. The patient tolerated the examination and the procedure well.     Plan for pitocin titration instead of cytotec administration due to contraction frequency.       Renay Suazo MD 2/28/2024 3:14 PM

## 2024-02-28 NOTE — PROGRESS NOTES
23 y.o.  female at 38w0d (Estimated Date of Delivery: 3/13/24) for PNV.    Pre-Sharon Vitals      Flowsheet Row Most Recent Value   Prenatal Assessment    Fetal Heart Rate 150   Movement Present   Presentation Vertex   Prenatal Vitals    Blood Pressure 130/78   Weight - Scale 134 kg (296 lb)   Urine Albumin/Glucose    Dilation/Effacement/Station    Cervical Dilation 1   Cervical Effacement 60   Fetal Station -2   Vaginal Drainage    Edema           TW.1 kg (31 lb)    Leakage of fluid: no  Vaginal bleeding: no  Contractions/Cramping: random contractions  Fetal movement: yes    NST today due to BMI.   NST reactive. Baseline 150.   NEGIN: 4.2, only one pocket of fluid without cord.  To L&D for admission and induction for oligohydramnios

## 2024-02-28 NOTE — PLAN OF CARE
Problem: Knowledge Deficit  Goal: Verbalizes understanding of labor plan  Description: Assess patient/family/caregiver's baseline knowledge level and ability to understand information.  Provide education via patient/family/caregiver's preferred learning method at appropriate level of understanding.     1. Provide teaching at level of understanding.  2. Provide teaching via preferred learning method(s).  Outcome: Progressing  Goal: Patient/family/caregiver demonstrates understanding of disease process, treatment plan, medications, and discharge instructions  Description: Complete learning assessment and assess knowledge base.  Interventions:  - Provide teaching at level of understanding  - Provide teaching via preferred learning methods  Outcome: Progressing     Problem: Labor & Delivery  Goal: Manages discomfort  Description: Assess and monitor for signs and symptoms of discomfort.  Assess patient's pain level regularly and per hospital policy.  Administer medications as ordered. Support use of nonpharmacological methods to help control pain such as distraction, imagery, relaxation, and application of heat and cold.  Collaborate with interdisciplinary team and patient to determine appropriate pain management plan.    1. Include patient in decisions related to comfort.  2. Offer non-pharmacological pain management interventions.  3. Report ineffective pain management to physician.  Outcome: Progressing  Goal: Patient vital signs are stable  Description: 1. Assess vital signs - vaginal delivery.  Outcome: Progressing     Problem: PAIN - ADULT  Goal: Verbalizes/displays adequate comfort level or baseline comfort level  Description: Interventions:  - Encourage patient to monitor pain and request assistance  - Assess pain using appropriate pain scale  - Administer analgesics based on type and severity of pain and evaluate response  - Implement non-pharmacological measures as appropriate and evaluate response  -  Consider cultural and social influences on pain and pain management  - Notify physician/advanced practitioner if interventions unsuccessful or patient reports new pain  Outcome: Progressing     Problem: INFECTION - ADULT  Goal: Absence or prevention of progression during hospitalization  Description: INTERVENTIONS:  - Assess and monitor for signs and symptoms of infection  - Monitor lab/diagnostic results  - Monitor all insertion sites, i.e. indwelling lines, tubes, and drains  - Monitor endotracheal if appropriate and nasal secretions for changes in amount and color  - [place] appropriate cooling/warming therapies per order  - Administer medications as ordered  - Instruct and encourage patient and family to use good hand hygiene technique  - Identify and instruct in appropriate isolation precautions for identified infection/condition  Outcome: Progressing  Goal: Absence of fever/infection during neutropenic period  Description: INTERVENTIONS:  - Monitor WBC    Outcome: Progressing     Problem: SAFETY ADULT  Goal: Patient will remain free of falls  Description: INTERVENTIONS:  - Educate patient/family on patient safety including physical limitations  - Instruct patient to call for assistance with activity   - Consult OT/PT to assist with strengthening/mobility   - Keep Call bell within reach  - Keep bed low and locked with side rails adjusted as appropriate  - Keep care items and personal belongings within reach  - Initiate and maintain comfort rounds  - Make Fall Risk Sign visible to staff    - Apply yellow socks and bracelet for high fall risk patients  - Consider moving patient to room near nurses station  Outcome: Progressing  Goal: Maintain or return to baseline ADL function  Description: INTERVENTIONS:  -  Assess patient's ability to carry out ADLs; assess patient's baseline for ADL function and identify physical deficits which impact ability to perform ADLs (bathing, care of mouth/teeth, toileting,  grooming, dressing, etc.)  - Assess/evaluate cause of self-care deficits   - Assess range of motion  - Assess patient's mobility; develop plan if impaired  - Assess patient's need for assistive devices and provide as appropriate  - Encourage maximum independence but intervene and supervise when necessary  - Involve family in performance of ADLs  - Assess for home care needs following discharge   - Consider OT consult to assist with ADL evaluation and planning for discharge  - Provide patient education as appropriate  Outcome: Progressing  Goal: Maintains/Returns to pre admission functional level  Description: INTERVENTIONS:  - Perform AM-PAC 6 Click Basic Mobility/ Daily Activity assessment daily.  - Set and communicate daily mobility goal to care team and patient/family/caregiver.   - Collaborate with rehabilitation services on mobility goals if consulted    - Record patient progress and toleration of activity level   Outcome: Progressing     Problem: DISCHARGE PLANNING  Goal: Discharge to home or other facility with appropriate resources  Description: INTERVENTIONS:  - Identify barriers to discharge w/patient and caregiver  - Arrange for needed discharge resources and transportation as appropriate  - Identify discharge learning needs (meds, wound care, etc.)  - Arrange for interpretive services to assist at discharge as needed  - Refer to Case Management Department for coordinating discharge planning if the patient needs post-hospital services based on physician/advanced practitioner order or complex needs related to functional status, cognitive ability, or social support system  Outcome: Progressing     Problem: BIRTH - VAGINAL/ SECTION  Goal: Fetal and maternal status remain reassuring during the birth process  Description: INTERVENTIONS:  - Monitor vital signs  - Monitor fetal heart rate  - Monitor uterine activity  - Monitor labor progression (vaginal delivery)  - DVT prophylaxis  - Antibiotic  prophylaxis  Outcome: Progressing  Goal: Emotionally satisfying birthing experience for mother/fetus  Description: Interventions:  - Assess, plan, implement and evaluate the nursing care given to the patient in labor  - Advocate the philosophy that each childbirth experience is a unique experience and support the family's chosen level of involvement and control during the labor process   - Actively participate in both the patient's and family's teaching of the birth process  - Consider cultural, Mormon and age-specific factors and plan care for the patient in labor  Outcome: Progressing

## 2024-02-29 LAB
BASE EXCESS BLDCOA CALC-SCNC: -1.8 MMOL/L (ref 3–11)
BASE EXCESS BLDCOV CALC-SCNC: -2.8 MMOL/L (ref 1–9)
HCO3 BLDCOA-SCNC: 23.6 MMOL/L (ref 17.3–27.3)
HCO3 BLDCOV-SCNC: 21.2 MMOL/L (ref 12.2–28.6)
O2 CT VFR BLDCOA CALC: 12.6 ML/DL
OXYHGB MFR BLDCOA: 53.4 %
OXYHGB MFR BLDCOV: 67 %
PCO2 BLDCOA: 42.7 MM[HG] (ref 30–60)
PCO2 BLDCOV: 35.1 MM HG (ref 27–43)
PH BLDCOA: 7.36 [PH] (ref 7.23–7.43)
PH BLDCOV: 7.4 [PH] (ref 7.19–7.49)
PO2 BLDCOA: 21.8 MM HG (ref 5–25)
PO2 BLDCOV: 25.9 MM HG (ref 15–45)
SAO2 % BLDCOV: 16 ML/DL

## 2024-02-29 PROCEDURE — 59400 OBSTETRICAL CARE: CPT | Performed by: OBSTETRICS & GYNECOLOGY

## 2024-02-29 PROCEDURE — NC001 PR NO CHARGE: Performed by: OBSTETRICS & GYNECOLOGY

## 2024-02-29 PROCEDURE — 82805 BLOOD GASES W/O2 SATURATION: CPT | Performed by: OBSTETRICS & GYNECOLOGY

## 2024-02-29 RX ORDER — IBUPROFEN 600 MG/1
600 TABLET ORAL EVERY 6 HOURS
Status: DISCONTINUED | OUTPATIENT
Start: 2024-02-29 | End: 2024-03-01 | Stop reason: HOSPADM

## 2024-02-29 RX ORDER — OXYTOCIN/RINGER'S LACTATE 30/500 ML
250 PLASTIC BAG, INJECTION (ML) INTRAVENOUS ONCE
Status: DISCONTINUED | OUTPATIENT
Start: 2024-02-29 | End: 2024-02-29

## 2024-02-29 RX ORDER — CALCIUM CARBONATE 500 MG/1
1000 TABLET, CHEWABLE ORAL DAILY PRN
Status: DISCONTINUED | OUTPATIENT
Start: 2024-02-29 | End: 2024-03-01 | Stop reason: HOSPADM

## 2024-02-29 RX ORDER — OXYTOCIN/RINGER'S LACTATE 30/500 ML
250 PLASTIC BAG, INJECTION (ML) INTRAVENOUS ONCE
Status: COMPLETED | OUTPATIENT
Start: 2024-02-29 | End: 2024-03-01

## 2024-02-29 RX ORDER — BENZOCAINE/MENTHOL 6 MG-10 MG
1 LOZENGE MUCOUS MEMBRANE DAILY PRN
Status: DISCONTINUED | OUTPATIENT
Start: 2024-02-29 | End: 2024-03-01 | Stop reason: HOSPADM

## 2024-02-29 RX ORDER — ACETAMINOPHEN 325 MG/1
650 TABLET ORAL EVERY 4 HOURS PRN
Status: DISCONTINUED | OUTPATIENT
Start: 2024-02-29 | End: 2024-03-01 | Stop reason: HOSPADM

## 2024-02-29 RX ORDER — LIDOCAINE HYDROCHLORIDE AND EPINEPHRINE 15; 5 MG/ML; UG/ML
INJECTION, SOLUTION EPIDURAL AS NEEDED
Status: DISCONTINUED | OUTPATIENT
Start: 2024-02-29 | End: 2024-03-01 | Stop reason: HOSPADM

## 2024-02-29 RX ORDER — LIDOCAINE HYDROCHLORIDE 10 MG/ML
10 INJECTION, SOLUTION EPIDURAL; INFILTRATION; INTRACAUDAL; PERINEURAL ONCE
Status: COMPLETED | OUTPATIENT
Start: 2024-02-29 | End: 2024-02-29

## 2024-02-29 RX ORDER — DOCUSATE SODIUM 100 MG/1
100 CAPSULE, LIQUID FILLED ORAL 2 TIMES DAILY
Status: DISCONTINUED | OUTPATIENT
Start: 2024-02-29 | End: 2024-03-01 | Stop reason: HOSPADM

## 2024-02-29 RX ORDER — ROPIVACAINE HYDROCHLORIDE 2 MG/ML
INJECTION, SOLUTION EPIDURAL; INFILTRATION; PERINEURAL CONTINUOUS PRN
Status: DISCONTINUED | OUTPATIENT
Start: 2024-02-29 | End: 2024-03-01 | Stop reason: HOSPADM

## 2024-02-29 RX ORDER — ONDANSETRON 2 MG/ML
4 INJECTION INTRAMUSCULAR; INTRAVENOUS EVERY 8 HOURS PRN
Status: DISCONTINUED | OUTPATIENT
Start: 2024-02-29 | End: 2024-03-01 | Stop reason: HOSPADM

## 2024-02-29 RX ORDER — LIDOCAINE HYDROCHLORIDE 10 MG/ML
INJECTION, SOLUTION EPIDURAL; INFILTRATION; INTRACAUDAL; PERINEURAL
Status: COMPLETED
Start: 2024-02-29 | End: 2024-02-29

## 2024-02-29 RX ADMIN — PENICILLIN G POTASSIUM 2.5 MILLION UNITS: 20000000 INJECTION, POWDER, FOR SOLUTION INTRAVENOUS at 13:16

## 2024-02-29 RX ADMIN — LIDOCAINE HYDROCHLORIDE 10 ML: 10 INJECTION, SOLUTION EPIDURAL; INFILTRATION; INTRACAUDAL; PERINEURAL at 14:45

## 2024-02-29 RX ADMIN — Medication 250 MILLI-UNITS/MIN: at 16:00

## 2024-02-29 RX ADMIN — ROPIVACAINE HYDROCHLORIDE: 2 INJECTION, SOLUTION EPIDURAL; INFILTRATION at 04:15

## 2024-02-29 RX ADMIN — MORPHINE SULFATE 2 MG: 2 INJECTION, SOLUTION INTRAMUSCULAR; INTRAVENOUS at 02:17

## 2024-02-29 RX ADMIN — SODIUM CHLORIDE, SODIUM LACTATE, POTASSIUM CHLORIDE, AND CALCIUM CHLORIDE 999 ML/HR: .6; .31; .03; .02 INJECTION, SOLUTION INTRAVENOUS at 13:42

## 2024-02-29 RX ADMIN — PENICILLIN G POTASSIUM 2.5 MILLION UNITS: 20000000 INJECTION, POWDER, FOR SOLUTION INTRAVENOUS at 09:39

## 2024-02-29 RX ADMIN — BENZOCAINE AND LEVOMENTHOL 1 APPLICATION: 200; 5 SPRAY TOPICAL at 16:01

## 2024-02-29 RX ADMIN — SODIUM CHLORIDE, SODIUM LACTATE, POTASSIUM CHLORIDE, AND CALCIUM CHLORIDE 925 ML/HR: .6; .31; .03; .02 INJECTION, SOLUTION INTRAVENOUS at 04:15

## 2024-02-29 RX ADMIN — IBUPROFEN 600 MG: 600 TABLET, FILM COATED ORAL at 22:12

## 2024-02-29 RX ADMIN — PENICILLIN G POTASSIUM 2.5 MILLION UNITS: 20000000 INJECTION, POWDER, FOR SOLUTION INTRAVENOUS at 01:00

## 2024-02-29 RX ADMIN — ROPIVACAINE HYDROCHLORIDE 10 ML/HR: 2 INJECTION EPIDURAL; INFILTRATION; PERINEURAL at 04:13

## 2024-02-29 RX ADMIN — ROPIVACAINE HYDROCHLORIDE: 2 INJECTION, SOLUTION EPIDURAL; INFILTRATION at 11:24

## 2024-02-29 RX ADMIN — LIDOCAINE HYDROCHLORIDE AND EPINEPHRINE 5 ML: 15; 5 INJECTION, SOLUTION EPIDURAL at 04:13

## 2024-02-29 RX ADMIN — PENICILLIN G POTASSIUM 2.5 MILLION UNITS: 20000000 INJECTION, POWDER, FOR SOLUTION INTRAVENOUS at 05:04

## 2024-02-29 RX ADMIN — DOCUSATE SODIUM 100 MG: 100 CAPSULE, LIQUID FILLED ORAL at 18:16

## 2024-02-29 NOTE — OB LABOR/OXYTOCIN SAFETY PROGRESS
Oxytocin Safety Progress Check Note - Elizabeth Camacho 23 y.o. female MRN: [id number]    Unit/Bed#: -01 Encounter: [id number]    Dose (eugene-units/min) Oxytocin: 20 eugene-units/min  Contraction Frequency (minutes): 1-2  Contraction Intensity: Mild/Moderate  Uterine Activity Characteristics: Irregular  Cervical Dilation: 5        Cervical Effacement: 80  Fetal Station: 1  Baseline Rate (FHR): 150 bpm  Fetal Heart Rate (FHT): 165 BPM  FHR Category: I               Vital Signs:   Vitals:    02/29/24 1110   BP:    Pulse:    Resp:    Temp: 97.6 °F (36.4 °C)   SpO2:        Notes/comments:   Fetal heart tracing notable for difficulty tracing baby.  Examined patient at bedside.  She is sitting upright.  On adjustment, FHT 140s to 150s beats per minute.  No decelerations appreciated while monitor was being held on baby.  As soon as monitor was left on its own, fetal heart tracing became spotty again.  Discussed repositioning with nurse.  Also epidural medicine is low.  Will discuss with nursing about refilling for patient.  She is comfortable and has no concerns at this time.    Naty Hays MD 2/29/2024 11:24 AM

## 2024-02-29 NOTE — PLAN OF CARE
Problem: Knowledge Deficit  Goal: Verbalizes understanding of labor plan  Description: Assess patient/family/caregiver's baseline knowledge level and ability to understand information.  Provide education via patient/family/caregiver's preferred learning method at appropriate level of understanding.     1. Provide teaching at level of understanding.  2. Provide teaching via preferred learning method(s).  Outcome: Progressing  Goal: Patient/family/caregiver demonstrates understanding of disease process, treatment plan, medications, and discharge instructions  Description: Complete learning assessment and assess knowledge base.  Interventions:  - Provide teaching at level of understanding  - Provide teaching via preferred learning methods  Outcome: Progressing     Problem: Labor & Delivery  Goal: Manages discomfort  Description: Assess and monitor for signs and symptoms of discomfort.  Assess patient's pain level regularly and per hospital policy.  Administer medications as ordered. Support use of nonpharmacological methods to help control pain such as distraction, imagery, relaxation, and application of heat and cold.  Collaborate with interdisciplinary team and patient to determine appropriate pain management plan.    1. Include patient in decisions related to comfort.  2. Offer non-pharmacological pain management interventions.  3. Report ineffective pain management to physician.  Outcome: Progressing  Goal: Patient vital signs are stable  Description: 1. Assess vital signs - vaginal delivery.  Outcome: Progressing     Problem: PAIN - ADULT  Goal: Verbalizes/displays adequate comfort level or baseline comfort level  Description: Interventions:  - Encourage patient to monitor pain and request assistance  - Assess pain using appropriate pain scale  - Administer analgesics based on type and severity of pain and evaluate response  - Implement non-pharmacological measures as appropriate and evaluate response  -  Consider cultural and social influences on pain and pain management  - Notify physician/advanced practitioner if interventions unsuccessful or patient reports new pain  Outcome: Progressing     Problem: INFECTION - ADULT  Goal: Absence or prevention of progression during hospitalization  Description: INTERVENTIONS:  - Assess and monitor for signs and symptoms of infection  - Monitor lab/diagnostic results  - Monitor all insertion sites, i.e. indwelling lines, tubes, and drains  - Monitor endotracheal if appropriate and nasal secretions for changes in amount and color  - [place] appropriate cooling/warming therapies per order  - Administer medications as ordered  - Instruct and encourage patient and family to use good hand hygiene technique  - Identify and instruct in appropriate isolation precautions for identified infection/condition  Outcome: Progressing  Goal: Absence of fever/infection during neutropenic period  Description: INTERVENTIONS:  - Monitor WBC    Outcome: Progressing     Problem: SAFETY ADULT  Goal: Patient will remain free of falls  Description: INTERVENTIONS:  - Educate patient/family on patient safety including physical limitations  - Instruct patient to call for assistance with activity   - Consult OT/PT to assist with strengthening/mobility   - Keep Call bell within reach  - Keep bed low and locked with side rails adjusted as appropriate  - Keep care items and personal belongings within reach  - Initiate and maintain comfort rounds  - Make Fall Risk Sign visible to staff  - - Apply yellow socks and bracelet for high fall risk patients  - Consider moving patient to room near nurses station  Outcome: Progressing  Goal: Maintain or return to baseline ADL function  Description: INTERVENTIONS:  -  Assess patient's ability to carry out ADLs; assess patient's baseline for ADL function and identify physical deficits which impact ability to perform ADLs (bathing, care of mouth/teeth, toileting,  grooming, dressing, etc.)  - Assess/evaluate cause of self-care deficits   - Assess range of motion  - Assess patient's mobility; develop plan if impaired  - Assess patient's need for assistive devices and provide as appropriate  - Encourage maximum independence but intervene and supervise when necessary  - Involve family in performance of ADLs  - Assess for home care needs following discharge   - Consider OT consult to assist with ADL evaluation and planning for discharge  - Provide patient education as appropriate  Outcome: Progressing  Goal: Maintains/Returns to pre admission functional level  Description: INTERVENTIONS:  - Perform AM-PAC 6 Click Basic Mobility/ Daily Activity assessment daily.  - Set and communicate daily mobility goal to care team and patient/family/caregiver.   - Collaborate with rehabilitation services on mobility goals if consulted  - - Out of bed for toileting  - Record patient progress and toleration of activity level   Outcome: Progressing     Problem: DISCHARGE PLANNING  Goal: Discharge to home or other facility with appropriate resources  Description: INTERVENTIONS:  - Identify barriers to discharge w/patient and caregiver  - Arrange for needed discharge resources and transportation as appropriate  - Identify discharge learning needs (meds, wound care, etc.)  - Arrange for interpretive services to assist at discharge as needed  - Refer to Case Management Department for coordinating discharge planning if the patient needs post-hospital services based on physician/advanced practitioner order or complex needs related to functional status, cognitive ability, or social support system  Outcome: Progressing     Problem: BIRTH - VAGINAL/ SECTION  Goal: Fetal and maternal status remain reassuring during the birth process  Description: INTERVENTIONS:  - Monitor vital signs  - Monitor fetal heart rate  - Monitor uterine activity  - Monitor labor progression (vaginal delivery)  - DVT  prophylaxis  - Antibiotic prophylaxis  Outcome: Progressing  Goal: Emotionally satisfying birthing experience for mother/fetus  Description: Interventions:  - Assess, plan, implement and evaluate the nursing care given to the patient in labor  - Advocate the philosophy that each childbirth experience is a unique experience and support the family's chosen level of involvement and control during the labor process   - Actively participate in both the patient's and family's teaching of the birth process  - Consider cultural, Religion and age-specific factors and plan care for the patient in labor  Outcome: Progressing

## 2024-02-29 NOTE — OB LABOR/OXYTOCIN SAFETY PROGRESS
Oxytocin Safety Progress Check Note - Elizabeth Camacho 23 y.o. female MRN: [id number]    Unit/Bed#: -01 Encounter: [id number]    Dose (eugene-units/min) Oxytocin: 8 eugene-units/min  Contraction Frequency (minutes): 1.5-3  Contraction Intensity: Moderate  Uterine Activity Characteristics: Irregular  Cervical Dilation: 5        Cervical Effacement: 70  Fetal Station: -2  Baseline Rate (FHR): 155 bpm  Fetal Heart Rate (FHT): 155 BPM  FHR Category: I               Vital Signs:   Vitals:    02/29/24 0100   BP: 131/79   Pulse: 88   Resp: 20   Temp: 97.9 °F (36.6 °C)   SpO2:        Notes/comments:   Elizabeth had several variable appearing decelerations on Novie monitor, SVE unchanged from prior.  During the decelerations on the monitor the monitor cut out and the decelerations were not audible.  She was switched back to regular external monitors and fetal heart rate is now category 1.  Elizabeth's pain is increasing with contractions however at this time she is not asking for pain meds or epidural.  Continue Pitocin titration.    Neha Christie MD 2/29/2024 1:57 AM

## 2024-02-29 NOTE — OB LABOR/OXYTOCIN SAFETY PROGRESS
Oxytocin Safety Progress Check Note - Elizabeth Camacho 23 y.o. female MRN: [id number]    Unit/Bed#: -01 Encounter: [id number]    Dose (eugene-units/min) Oxytocin: 6 eugene-units/min  Contraction Frequency (minutes):  (diffcult to trace, patient bouncing on ball)  Contraction Intensity: Mild/Moderate  Uterine Activity Characteristics: Irritability  Cervical Dilation: 4        Cervical Effacement: 60  Fetal Station: -2  Baseline Rate (FHR): 155 bpm  Fetal Heart Rate (FHT): 155 BPM  FHR Category: I               Vital Signs:   Vitals:    02/28/24 2015   BP: 134/79   Pulse: 98   Resp: 20   Temp: 97.9 °F (36.6 °C)   SpO2: 98%       Notes/comments:   Elizabeth is laboring on the yoga ball, still feeling fine with the contractions. FHR category I. Continue pitocin titration.    Neha Christie MD 2/28/2024 10:00 PM

## 2024-02-29 NOTE — DISCHARGE SUMMARY
Discharge Summary - OB/GYN  Elizabeth Camacho 23 y.o. female MRN: [id number]  Unit/Bed#: -01 Encounter: [id number]    Admission Date: 2024     Discharge Date: ***    Admitting Attending: Gabriele Carmona Attending: Janene    Discharging Attending: ***    Principal Diagnosis: Pregnancy at 38w1d    Secondary Diagnosis:   1. Oligohydramnios  2. Gestational hypertension   3. Varicella non-immune    Procedures: spontaneous vaginal delivery    Anesthesia: epidural    Hospital course: Ms. Elizabeth Camacho is a 23 y.o.  at 38w0d. She presented to labor and delivery for induction of labor in the setting of oligohydramnios. Her pregnancy was complicated by gestational hypertension and varicella non-immunity. On exam on arrival she was noted to be 1.5/60/-2. She was induced with Jordan balloon and Pitocin.  Amniotomy was performed for clear fluid.  See if an epidural for analgesia.  She complete and started pushing.     She delivered a viable male  on 2024 at 1437. Weight ***lbs ***oz via normal spontaneous vaginal delivery. She sustained right labial and 1st degree perineal lacerations during delivery which were adequately repaired. Apgars were 9 (1 min) and 9 (5 min).  was transferred to  nursery. Patient tolerated the procedure well.     Her post-delivery course was complicated by ***. Her postpartum pain was well controlled with ***oral analgesics.    On day of discharge, she was ambulating and able to reasonably perform all ADLs. She was voiding and had appropriate bowel function. Pain was well controlled. She was discharged home on postpartum day #*** without complications. Patient was instructed to follow up with her OB as an outpatient and was given appropriate warnings to call provider if she develops signs of infection or uncontrolled pain.    Complications: none apparent    Condition at discharge: good     Discharge instructions/Information to patient and family:   See after visit  summary for information provided to patient and family.      Provisions for Follow-Up Care:  See after visit summary for information related to follow-up care and any pertinent home health orders.      Disposition: See After Visit Summary for discharge disposition information.    Planned Readmission: No    Discharge medications and instructions:   Please see AVS for full list of medications upon discharge.      ***

## 2024-02-29 NOTE — OB LABOR/OXYTOCIN SAFETY PROGRESS
Oxytocin Safety Progress Check Note - Elizabeth Camacho 23 y.o. female MRN: [id number]    Unit/Bed#: -01 Encounter: [id number]    Dose (eugene-units/min) Oxytocin: 8 eugene-units/min  Contraction Frequency (minutes): 2-5  Contraction Intensity: Moderate  Uterine Activity Characteristics: Irregular  Cervical Dilation: 5        Cervical Effacement: 70  Fetal Station: -2  Baseline Rate (FHR): 155 bpm  Fetal Heart Rate (FHT): 154 BPM  FHR Category: I               Vital Signs:   Vitals:    02/28/24 2255   BP: 138/81   Pulse: 89   Resp:    Temp: 98.7 °F (37.1 °C)   SpO2:        Notes/comments:   Elizabeth is feeling well, reports increasing strength of contractions but pain is adequately controlled at this time. SVE as above, amniotomy performed for clear fluid. FHR category I. Continue pitocin titration.    Neha Christie MD 2/29/2024 12:46 AM

## 2024-02-29 NOTE — PLAN OF CARE
Problem: Knowledge Deficit  Goal: Verbalizes understanding of labor plan  Description: Assess patient/family/caregiver's baseline knowledge level and ability to understand information.  Provide education via patient/family/caregiver's preferred learning method at appropriate level of understanding.     1. Provide teaching at level of understanding.  2. Provide teaching via preferred learning method(s).  Outcome: Progressing  Goal: Patient/family/caregiver demonstrates understanding of disease process, treatment plan, medications, and discharge instructions  Description: Complete learning assessment and assess knowledge base.  Interventions:  - Provide teaching at level of understanding  - Provide teaching via preferred learning methods  Outcome: Progressing     Problem: Labor & Delivery  Goal: Manages discomfort  Description: Assess and monitor for signs and symptoms of discomfort.  Assess patient's pain level regularly and per hospital policy.  Administer medications as ordered. Support use of nonpharmacological methods to help control pain such as distraction, imagery, relaxation, and application of heat and cold.  Collaborate with interdisciplinary team and patient to determine appropriate pain management plan.    1. Include patient in decisions related to comfort.  2. Offer non-pharmacological pain management interventions.  3. Report ineffective pain management to physician.  Outcome: Progressing  Goal: Patient vital signs are stable  Description: 1. Assess vital signs - vaginal delivery.  Outcome: Progressing     Problem: PAIN - ADULT  Goal: Verbalizes/displays adequate comfort level or baseline comfort level  Description: Interventions:  - Encourage patient to monitor pain and request assistance  - Assess pain using appropriate pain scale  - Administer analgesics based on type and severity of pain and evaluate response  - Implement non-pharmacological measures as appropriate and evaluate response  -  Consider cultural and social influences on pain and pain management  - Notify physician/advanced practitioner if interventions unsuccessful or patient reports new pain  Outcome: Progressing     Problem: INFECTION - ADULT  Goal: Absence or prevention of progression during hospitalization  Description: INTERVENTIONS:  - Assess and monitor for signs and symptoms of infection  - Monitor lab/diagnostic results  - Monitor all insertion sites, i.e. indwelling lines, tubes, and drains  - Monitor endotracheal if appropriate and nasal secretions for changes in amount and color  - [place] appropriate cooling/warming therapies per order  - Administer medications as ordered  - Instruct and encourage patient and family to use good hand hygiene technique  - Identify and instruct in appropriate isolation precautions for identified infection/condition  Outcome: Progressing  Goal: Absence of fever/infection during neutropenic period  Description: INTERVENTIONS:  - Monitor WBC    Outcome: Progressing     Problem: SAFETY ADULT  Goal: Patient will remain free of falls  Description: INTERVENTIONS:  - Educate patient/family on patient safety including physical limitations  - Instruct patient to call for assistance with activity   - Consult OT/PT to assist with strengthening/mobility   - Keep Call bell within reach  - Keep bed low and locked with side rails adjusted as appropriate  - Keep care items and personal belongings within reach  - Initiate and maintain comfort rounds  - Make Fall Risk Sign visible to staff  - Offer Toileting every  Hours, in advance of need  - Initiate/Maintain alarm  - Obtain necessary fall risk management equipment:   - Apply yellow socks and bracelet for high fall risk patients  - Consider moving patient to room near nurses station  Outcome: Progressing  Goal: Maintain or return to baseline ADL function  Description: INTERVENTIONS:  -  Assess patient's ability to carry out ADLs; assess patient's baseline for  ADL function and identify physical deficits which impact ability to perform ADLs (bathing, care of mouth/teeth, toileting, grooming, dressing, etc.)  - Assess/evaluate cause of self-care deficits   - Assess range of motion  - Assess patient's mobility; develop plan if impaired  - Assess patient's need for assistive devices and provide as appropriate  - Encourage maximum independence but intervene and supervise when necessary  - Involve family in performance of ADLs  - Assess for home care needs following discharge   - Consider OT consult to assist with ADL evaluation and planning for discharge  - Provide patient education as appropriate  Outcome: Progressing  Goal: Maintains/Returns to pre admission functional level  Description: INTERVENTIONS:  - Perform AM-PAC 6 Click Basic Mobility/ Daily Activity assessment daily.  - Set and communicate daily mobility goal to care team and patient/family/caregiver.   - Collaborate with rehabilitation services on mobility goals if consulted  - Perform Range of Motion  times a day.  - Reposition patient every  hours.  - Dangle patient  times a day  - Stand patient  times a day  - Ambulate patient  times a day  - Out of bed to chair  times a day   - Out of bed for meals  times a day  - Out of bed for toileting  - Record patient progress and toleration of activity level   Outcome: Progressing     Problem: DISCHARGE PLANNING  Goal: Discharge to home or other facility with appropriate resources  Description: INTERVENTIONS:  - Identify barriers to discharge w/patient and caregiver  - Arrange for needed discharge resources and transportation as appropriate  - Identify discharge learning needs (meds, wound care, etc.)  - Arrange for interpretive services to assist at discharge as needed  - Refer to Case Management Department for coordinating discharge planning if the patient needs post-hospital services based on physician/advanced practitioner order or complex needs related to functional  status, cognitive ability, or social support system  Outcome: Progressing     Problem: BIRTH - VAGINAL/ SECTION  Goal: Fetal and maternal status remain reassuring during the birth process  Description: INTERVENTIONS:  - Monitor vital signs  - Monitor fetal heart rate  - Monitor uterine activity  - Monitor labor progression (vaginal delivery)  - DVT prophylaxis  - Antibiotic prophylaxis  Outcome: Progressing  Goal: Emotionally satisfying birthing experience for mother/fetus  Description: Interventions:  - Assess, plan, implement and evaluate the nursing care given to the patient in labor  - Advocate the philosophy that each childbirth experience is a unique experience and support the family's chosen level of involvement and control during the labor process   - Actively participate in both the patient's and family's teaching of the birth process  - Consider cultural, Scientology and age-specific factors and plan care for the patient in labor  Outcome: Progressing

## 2024-02-29 NOTE — OB LABOR/OXYTOCIN SAFETY PROGRESS
Oxytocin Safety Progress Check Note - Elizabeth Camacho 23 y.o. female MRN: [id number]    Unit/Bed#: -01 Encounter: [id number]    Dose (eugene-units/min) Oxytocin: 10 eugene-units/min  Contraction Frequency (minutes): 2.5  Contraction Intensity: Moderate  Uterine Activity Characteristics: Regular  Cervical Dilation: 5        Cervical Effacement: 70  Fetal Station: -2  Baseline Rate (FHR): 155 bpm  Fetal Heart Rate (FHT): 150 BPM  FHR Category: I               Vital Signs:   Vitals:    02/29/24 0505   BP: 132/75   Pulse: 96   Resp:    Temp:    SpO2:        Notes/comments:   Elizabeth is sleeping comfortably with her epidural. FHR category I. SVE deferred at this check. Continue pitocin.    Neha Christie MD 2/29/2024 5:44 AM

## 2024-02-29 NOTE — H&P
H & P- Obstetrics   Elizabeth Camacho 23 y.o. female MRN: [id number]  Unit/Bed#: -01 Encounter: [id number]      Assessment/Plan:    Elizabeth is a 23 y.o.  at 38w0d admitted for an induction of labor for oligohydramnios    SVE: 1.5/60/-2    Susceptible to varicella (non-immune), currently pregnant  Assessment & Plan  Offer varivax postpartum    Gestational hypertension  Assessment & Plan  CBC and CMP wnl, P/C 0.15    Systolic (24hrs), Av , Min:119 , Max:158   Diastolic (24hrs), Av, Min:61, Max:94        * Encounter for induction of labor  Assessment & Plan  Induction for oligohydramnios and gestational hypertension  Admit to OBGYN   Clear liquid diet   F/u T&S, CBC, RPR   IVF LR 125cc/hr   Continuous fetal monitoring and tocometry   Analgesia at maternal request   GBS positive, PCN per protocol  Induction plan victoria balloon and pitocin               Patient of: [facility]  This patient will be an INPATIENT  and I certify the anticipated length of stay is >2 Midnights  Discussed with Dr. Suazo      SUBJECTIVE:    Chief Complaint: Sent from clinic for new diagnosis of oligohydramnios and gestational hypertension    HPI: Elizabeth Camacho is a 23 y.o.  with an ZEE of 3/13/2024, by Last Menstrual Period at 38w0d who is being admitted for induction of labor. She denies having uterine contractions, has no LOF, and reports no VB. She states she has felt good FM. This pregnancy is complicated by gestational hypertension, elevated BMI. All other review of systems is negative.       Pregnancy Plan:  Pregnancy: [place]     Delivery Plans  Planned delivery location: [facility] L&D     Post-Delivery Plans  Feeding intentions: Breast Milk      Patient Active Problem List   Diagnosis    Acquired kyphosis    38 weeks gestation of pregnancy    Obesity affecting pregnancy in third trimester    Gestational hypertension    Encounter for induction of labor    Susceptible to varicella (non-immune), currently  pregnant       OB History    Para Term  AB Living   1 0 0 0 0 0   SAB IAB Ectopic Multiple Live Births   0 0 0 0 0      # Outcome Date GA Lbr Jose/2nd Weight Sex Delivery Anes PTL Lv   1 Current               Obstetric Comments   Menarche 14       Past Medical History:   Diagnosis Date    Herpes     type 1 vaginally    Varicella     immunized       Past Surgical History:   Procedure Laterality Date    MOUTH SURGERY      [place] teeth       Social History     Tobacco Use    Smoking status: Never     Passive exposure: Never    Smokeless tobacco: Never   Substance Use Topics    Alcohol use: Not Currently     Comment: social       No Known Allergies    Medications Prior to Admission   Medication    Prenatal MV-Min-Fe Fum-FA-DHA (PRENATAL 1 PO)           OBJECTIVE:  Vitals:  Temp:  [97.7 °F (36.5 °C)-98.5 °F (36.9 °C)] 97.9 °F (36.6 °C)  HR:  [] 98  Resp:  [18-20] 20  BP: (119-158)/(61-94) 134/79  Body mass index is 42.47 kg/m².     Physical Exam:  General: Well appearing, no distress  Respiratory: Unlabored breathing  Cardiovascular: Regular rate.  Abdomen: Soft, gravid, nontender  Fundal Height: Appropriate for gestational age.  Extremities: Warm and well perfused.  Non tender.  Psychiatric: Behavioral normal        FHT:  Baseline Rate (FHR): 155 bpm  Variability: Moderate  Accelerations: 15 x 15 or greater, At variable times  Decelerations: None  FHR Category: Category I    TOCO:   Contraction Frequency (minutes): 1-2.5  Contraction Duration (seconds): 30-60  Contraction Intensity: Mild/Moderate      Prenatal Labs:   I have personally reviewed pertinent reports.  Blood Type:   Lab Results   Component Value Date/Time    ABO Grouping A 2024 01:22 PM   D (Rh type):   Lab Results   Component Value Date/Time    Rh Factor Positive 2024 01:22 PM   Antibody Screen: Negative  HCT/HGB:   Lab Results   Component Value Date/Time    Hematocrit 38.7 2024 01:22 PM    Hemoglobin 12.9 2024  "01:22 PM   MCV:   Lab Results   Component Value Date/Time    MCV 89 02/28/2024 01:22 PM   Platelets:   Lab Results   Component Value Date/Time    Platelets 226 02/28/2024 01:22 PM   1 hour Glucola:   Lab Results   Component Value Date/Time    Glucose 74 12/04/2023 11:16 AM   Varicella:   Lab Results   Component Value Date/Time    Varicella IgG NON-IMMUNE (A) 08/30/2023 03:17 PM   Rubella:   Lab Results   Component Value Date/Time    Rubella IgG Quant 102.5 08/30/2023 03:17 PM   VDRL/RPR: Non reactive  Urine Culture/Screen:   Lab Results   Component Value Date/Time    Urine Culture 10,000-19,000 cfu/ml 08/30/2023 03:17 PM   Hep B:   Lab Results   Component Value Date/Time    Hepatitis B Surface Ag Non-reactive 08/30/2023 03:17 PM   Hep C:   HIV:   Lab Results   Component Value Date/Time    HIV-1/HIV-2 AB Non-Reactive 09/01/2023 12:00 AM   Chlamydia: Negative  Gonorrhea:   Lab Results   Component Value Date/Time    N gonorrhoeae, DNA Probe Negative 02/16/2024 10:43 AM   Group B Strep:    Lab Results   Component Value Date/Time    Strep Grp B PCR Positive (A) 02/16/2024 10:43 AM            Neha Christie MD  2/28/2024  8:55 PM        Portions of the record may have been created with voice recognition software.  Occasional wrong word or \"sound a like\" substitutions may have occurred due to the inherent limitations of voice recognition software.  Read the chart carefully and recognize, using context, where substitutions have occurred    "

## 2024-02-29 NOTE — L&D DELIVERY NOTE
Vaginal Delivery Summary - OB/GYN   Elizabeth Camacho 23 y.o. female MRN: [id number]  Unit/Bed#: -01 Encounter: [id number]    Right anterior, left posterior. First degree, labial. Repaired. Wide op  Pre-delivery Diagnosis:   1. Pregnancy at 38 weeks gestation    2. Oligohydramnios   3. Gestational hypertension     Post-delivery Diagnosis: same, delivered    Procedure: Spontaneous Vaginal Delivery     Attending: Janene     Assistant(s): Barr    Anesthesia: Epidural    QBL: 100 mL    Complications: none apparent    Specimens:   1. Arterial and venous cord gases  2. Cord blood  3. Segment of umbilical cord  4. Placenta to storage     Findings:  1. Viable male on 2024 at 1437, with APGARS of 9 and 9 at 1 and 5 minutes respectively  2. Spontaneous delivery of intact placenta at 1441  3. 1 degree perineal laceration and right labial laceration, both repaired with 3-0 Vicryl  4. Blood gases:   Arterial pH: 7.361   Arterial base excess: -1.8   Venous pH: 7.399   Venous base excess: -2.8    Disposition:  Patient tolerated the procedure well and was recovering in labor and delivery room       Brief history and labor course:  Ms. Elizabeth Camacho is a 23 y.o.  at 38w0d. She presented to labor and delivery for induction of labor in the setting of oligohydramnios. Her pregnancy was complicated by gestational hypertension and varicella non-immunity. On exam on arrival she was noted to be 1.5/60/-2. She was induced with Jordan balloon and Pitocin.  Amniotomy was performed for clear fluid.  See if an epidural for analgesia.  She complete and started pushing.    Description of procedure:  After pushing for 32 minutes, at 1437 patient delivered a viable male , wt pending, apgars of 9 (1 min) and 9 (5 min). The fetal vertex delivered spontaneously. There was no nuchal cord. The right anterior shoulder delivered atraumatically with maternal expulsive forces and the assistance of gentle downward traction. The left  posterior shoulder delivered with maternal expulsive forces and the assistance of gentle upward traction. The remainder of the fetus delivered spontaneously.     Upon delivery, the infant was placed on the mothers abdomen and the cord was clamped and cut after delayed cord clamping. The infant was noted to cry spontaneously and was moving all extremities appropriately. There was no evidence for injury. Awaiting nurse resuscitators evaluated the . Arterial and venous cord blood gases and cord blood was collected for analysis. These were promptly sent to the lab. In the immediate post-partum, 30 units of IV pitocin was administered. The pitocin was opened wide open for lower uterine segment atony. Though the uterus did not contract down well at first, bleeding was never heavy. The placenta delivered spontaneously at 1441 and was noted to have a centrally inserted 3 vessel cord.     The vagina, cervix, perineum, and rectum were inspected and there was noted to be a right labial laceration and a small first degree perineal laceration.  When the repair was initiated, the patient stated she felt sharp pain still.  Lidocaine was injected into the area, about 10 mL.  Analgesia was found to be adequate at this time. Under adequate anesthesia, the apex of the vaginal laceration was identified and an anchoring suture was placed 1 cm above the apex. The vaginal mucosa and underlying rectovaginal fascia were closed using a running locked 3-0 Vicryl-CT 1.  The laceration extended slightly into the skin of the left labia.  The skin was reapproximated in a running subcuticular fashion.  Excellent hemostasis and reapproximation was achieved.      Bimanual exam revealed minimal clots and good uterine tone.  The fundus was firm and at the level of the umbilicus.  At the conclusion of the procedure, all needle, sponge, and instrument counts were noted to be correct. Patient tolerated the procedure well and was allowed to recover  in labor and delivery room with family and  before being transferred to the post-partum floor. Dr. Watters was present and participated in all key portions of the case.      Naty Hays MD  2024  3:14 PM

## 2024-02-29 NOTE — OB LABOR/OXYTOCIN SAFETY PROGRESS
Oxytocin Safety Progress Check Note - Elizabeth Camacho 23 y.o. female MRN: [id number]    Unit/Bed#: -01 Encounter: [id number]    Dose (eugene-units/min) Oxytocin: 10 eugene-units/min  Contraction Frequency (minutes): 2-3  Contraction Intensity: Moderate/Strong  Uterine Activity Characteristics: Regular  Cervical Dilation: 5        Cervical Effacement: 70  Fetal Station: -2  Baseline Rate (FHR): 155 bpm  Fetal Heart Rate (FHT): 155 BPM  FHR Category: 1               Vital Signs:   Vitals:    02/29/24 0651   BP: 137/82   Pulse:    Resp:    Temp:    SpO2:        Patient comfortable with epidural.  SVE as above, per Dr. Watters. FHT cat 1. Nicolas q2-3. Pitocin at 10, will continue to titrate as tolerated. Dr. Watters at bedside.       Katherine Wilson MD 2/29/2024 7:36 AM

## 2024-02-29 NOTE — OB LABOR/OXYTOCIN SAFETY PROGRESS
Oxytocin Safety Progress Check Note - Elizabeth Camacho 23 y.o. female MRN: [id number]    Unit/Bed#: -01 Encounter: [id number]    Dose (eugene-units/min) Oxytocin: 18 eugene-units/min  Contraction Frequency (minutes): 1-2  Contraction Intensity: Mild/Moderate  Uterine Activity Characteristics: Irregular  Cervical Dilation: 5        Cervical Effacement: 80  Fetal Station: 1  Baseline Rate (FHR): 150 bpm  Fetal Heart Rate (FHT): 155 BPM  FHR Category: 2               Vital Signs:   Vitals:    02/29/24 1004   BP: 135/77   Pulse: 105   Resp:    Temp:    SpO2:        Patient feeling lots of pressure.  SVE as above. FHT cat 2 for occasional late decelerations, overall with good variability. Nicolas q1-2 irregularly. Pitocin at 18, will continue to titrate as tolerated. D/w Dr. Watters.       Katherine Wilson MD 2/29/2024 10:35 AM

## 2024-02-29 NOTE — ANESTHESIA PREPROCEDURE EVALUATION
Procedure:  LABOR ANALGESIA    Relevant Problems   CARDIO   (+) Gestational hypertension      GYN   (+) 38 weeks gestation of pregnancy        Physical Exam    Airway    Mallampati score: III  TM Distance: >3 FB  Neck ROM: full     Dental       Cardiovascular      Pulmonary      Other Findings  post-pubertal.      Anesthesia Plan  ASA Score- 3     Anesthesia Type- epidural with ASA Monitors.         Additional Monitors:     Airway Plan:            Plan Factors-Exercise tolerance (METS): >4 METS.    Chart reviewed.                      Induction- intravenous.    Postoperative Plan-     Informed Consent- Anesthetic plan and risks discussed with patient.  I personally reviewed this patient with the CRNA. Discussed and agreed on the Anesthesia Plan with the CRNA..

## 2024-02-29 NOTE — ASSESSMENT & PLAN NOTE
CBC and CMP wnl, P/C 0.15  Systolic (24hrs), Av , Min:104 , Max:188   Diastolic (24hrs), Av, Min:55, Max:95

## 2024-02-29 NOTE — ANESTHESIA PROCEDURE NOTES
Epidural Block    Patient location during procedure: OB/L&D  Start time: 2/29/2024 4:12 AM  Reason for block: procedure for pain  Staffing  Performed by: Anuj Armando MD  Authorized by: Anuj Armando MD    Preanesthetic Checklist  Completed: patient identified, IV checked, site marked, risks and benefits discussed, surgical consent, monitors and equipment checked, pre-op evaluation and timeout performed  Epidural  Patient position: sitting  Prep: ChloraPrep  Sedation Level: no sedation  Patient monitoring: frequent blood pressure checks, continuous pulse oximetry and heart rate  Approach: midline  Location: lumbar, L4-5  Injection technique: LOWELL saline  Needle  Needle type: Tuohy   Needle gauge: 18 G  Needle insertion depth: 6 cm  Catheter type: multi-orifice  Catheter size: 20 G  Catheter at skin depth: 11 cm  Catheter securement method: stabilization device and clear occlusive dressing  Test dose: negative  Assessment  Sensory level: T10  Number of attempts: 1negative aspiration for CSF, negative aspiration for heme and no paresthesia on injection  patient tolerated the procedure well with no immediate complications  Additional Notes  Uncomplicated single pass epidural.

## 2024-02-29 NOTE — OB LABOR/OXYTOCIN SAFETY PROGRESS
Oxytocin Safety Progress Check Note - Elizabeth Camacho 23 y.o. female MRN: [id number]    Unit/Bed#: -01 Encounter: [id number]    Dose (eugene-units/min) Oxytocin: 22 eugene-units/min  Contraction Frequency (minutes): 2-3  Contraction Intensity: Moderate  Uterine Activity Characteristics: Regular  Cervical Dilation: 7        Cervical Effacement: 100  Fetal Station: 1  Baseline Rate (FHR): 155 bpm  Fetal Heart Rate (FHT): 150 BPM  FHR Category: Cat 1               Vital Signs:   Vitals:    02/29/24 1244   BP:    Pulse:    Resp:    Temp: 99.5 °F (37.5 °C)   SpO2:        Notes/comments:   Pt feeling more pressure. Now 7cm, in active labor. Continue active management.     Chapis Watters MD 2/29/2024 1:26 PM

## 2024-02-29 NOTE — OB LABOR/OXYTOCIN SAFETY PROGRESS
Oxytocin Safety Progress Check Note - Elizabeth Camacho 23 y.o. female MRN: [id number]    Unit/Bed#: -01 Encounter: [id number]    Dose (eugene-units/min) Oxytocin: 6 eugene-units/min  Contraction Frequency (minutes): 1-2  Contraction Intensity: Moderate  Uterine Activity Characteristics: Irritability  Cervical Dilation: 4        Cervical Effacement: 60  Fetal Station: -2  Baseline Rate (FHR): 155 bpm  Fetal Heart Rate (FHT): 164 BPM  FHR Category: I               Vital Signs:   Vitals:    02/28/24 2015   BP: 134/79   Pulse: 98   Resp: 20   Temp: 97.9 °F (36.6 °C)   SpO2: 98%       Notes/comments:   Elizabeth is resting comfortably, requested cervical check.  SVE as above, unchanged from prior. FHR category I. Fetal head less ballotable than prior however still displaceable with upward force.  Anticipate amniotomy when fetal head is well applied to cervix.  Continue Pitocin titration.      Neha Christie MD 2/28/2024 10:39 PM

## 2024-02-29 NOTE — OB LABOR/OXYTOCIN SAFETY PROGRESS
Oxytocin Safety Progress Check Note - Elizabeth Camacho 23 y.o. female MRN: [id number]    Unit/Bed#: -01 Encounter: [id number]    Dose (eugene-units/min) Oxytocin: 8 eugene-units/min  Contraction Frequency (minutes): 2-3  Contraction Intensity: Moderate  Uterine Activity Characteristics: Irregular  Cervical Dilation: 5        Cervical Effacement: 70  Fetal Station: -2  Baseline Rate (FHR): 150 bpm  Fetal Heart Rate (FHT): 158 BPM  FHR Category: 1     Vital Signs:   Vitals:    02/29/24 0230   BP: 144/89   Pulse:    Resp:    Temp:    SpO2:        Notes/comments:     Elizabeth is more uncomfortable and requesting an epidural. On cervical exam, she is unchanged. FHT is category I. Plan to recheck in 2 hrs once comfortable. Continue to uptitrate pitocin.       Cece Odell MD 2/29/2024 3:50 AM

## 2024-02-29 NOTE — OB LABOR/OXYTOCIN SAFETY PROGRESS
Oxytocin Safety Progress Check Note - Elizabeth Camacho 23 y.o. female MRN: [id number]    Unit/Bed#: -01 Encounter: [id number]    Dose (eugene-units/min) Oxytocin: 4 eugene-units/min  Contraction Frequency (minutes): 1-4  Contraction Intensity: Mild/Moderate  Uterine Activity Characteristics: Irritability  Cervical Dilation: 4        Cervical Effacement: 60  Fetal Station: -2  Baseline Rate (FHR): 150 bpm  Fetal Heart Rate (FHT): 158 BPM  FHR Category: I               Vital Signs:   Vitals:    02/28/24 1836   BP: 136/84   Pulse: 94   Resp:    Temp:        Notes/comments:   Elizabeth is resting comfortably after victoria balloon fell out, laboring on yoga ball. SVE as above, FHR category I. Continue pitocin titration.    Neha Christie MD 2/28/2024 7:56 PM

## 2024-02-29 NOTE — PLAN OF CARE
Problem: Knowledge Deficit  Goal: Verbalizes understanding of labor plan  Description: Assess patient/family/caregiver's baseline knowledge level and ability to understand information.  Provide education via patient/family/caregiver's preferred learning method at appropriate level of understanding.     1. Provide teaching at level of understanding.  2. Provide teaching via preferred learning method(s).  2/29/2024 1608 by Luna Montez RN  Outcome: Completed  2/29/2024 1026 by Luna Montez RN  Outcome: Progressing  Goal: Patient/family/caregiver demonstrates understanding of disease process, treatment plan, medications, and discharge instructions  Description: Complete learning assessment and assess knowledge base.  Interventions:  - Provide teaching at level of understanding  - Provide teaching via preferred learning methods  2/29/2024 1608 by Luna Montez RN  Outcome: Completed  2/29/2024 1026 by Luna Montez RN  Outcome: Progressing     Problem: Labor & Delivery  Goal: Manages discomfort  Description: Assess and monitor for signs and symptoms of discomfort.  Assess patient's pain level regularly and per hospital policy.  Administer medications as ordered. Support use of nonpharmacological methods to help control pain such as distraction, imagery, relaxation, and application of heat and cold.  Collaborate with interdisciplinary team and patient to determine appropriate pain management plan.    1. Include patient in decisions related to comfort.  2. Offer non-pharmacological pain management interventions.  3. Report ineffective pain management to physician.  2/29/2024 1608 by Luna Montez RN  Outcome: Completed  2/29/2024 1026 by Luna Montez RN  Outcome: Progressing  Goal: Patient vital signs are stable  Description: 1. Assess vital signs - vaginal delivery.  2/29/2024 1608 by Luna Montez RN  Outcome: Completed  2/29/2024 1026 by Luna Montez RN  Outcome: Progressing

## 2024-02-29 NOTE — ASSESSMENT & PLAN NOTE
Continue routine post partum care  Pain well controlled: tylenol/motrin scheduled  Lochia within normal limits: continue to monitor   OOB: as able, encourage ambulation  Passing flatus  Voiding spontaneously  Breastfeeding  Baby in: room  Dispo: anticipate d/c home PPD1-2

## 2024-03-01 VITALS
OXYGEN SATURATION: 97 % | HEART RATE: 82 BPM | RESPIRATION RATE: 18 BRPM | SYSTOLIC BLOOD PRESSURE: 119 MMHG | TEMPERATURE: 97.6 F | DIASTOLIC BLOOD PRESSURE: 72 MMHG | BODY MASS INDEX: 41.95 KG/M2 | HEIGHT: 70 IN | WEIGHT: 293 LBS

## 2024-03-01 PROCEDURE — 99024 POSTOP FOLLOW-UP VISIT: CPT | Performed by: OBSTETRICS & GYNECOLOGY

## 2024-03-01 RX ORDER — IBUPROFEN 600 MG/1
600 TABLET ORAL EVERY 6 HOURS
Start: 2024-03-01

## 2024-03-01 RX ORDER — DOCUSATE SODIUM 100 MG/1
100 CAPSULE, LIQUID FILLED ORAL 2 TIMES DAILY
Start: 2024-03-01

## 2024-03-01 RX ORDER — CALCIUM CARBONATE 500 MG/1
1000 TABLET, CHEWABLE ORAL DAILY PRN
Start: 2024-03-01

## 2024-03-01 RX ORDER — ACETAMINOPHEN 325 MG/1
650 TABLET ORAL EVERY 4 HOURS PRN
Start: 2024-03-01

## 2024-03-01 RX ADMIN — BENZOCAINE AND LEVOMENTHOL 1 APPLICATION: 200; 5 SPRAY TOPICAL at 16:57

## 2024-03-01 RX ADMIN — DOCUSATE SODIUM 100 MG: 100 CAPSULE, LIQUID FILLED ORAL at 08:02

## 2024-03-01 RX ADMIN — IBUPROFEN 600 MG: 600 TABLET, FILM COATED ORAL at 08:02

## 2024-03-01 NOTE — LACTATION NOTE
This note was copied from a baby's chart.  CONSULT - LACTATION  Baby Boy Camacho (Kasie) 0 days male MRN: [id number]    [facility] NURSERY Room / Bed: (N)/(N) Encounter: [id number]    Maternal Information     MOTHER:  Elizabeth Camacho  Maternal Age: 23 y.o.   OB History: # 1 - Date: 24, Sex: Male, Weight: 3480 g (7 lb 10.8 oz), GA: 38w1d, Delivery: Vaginal, Spontaneous, Apgar1: 9, Apgar5: 9, Living: Living, Birth Comments: None   Previouse breast reduction surgery? No    Lactation history:   Has patient previously breast fed: No   How long had patient previously breast fed:     Previous breast feeding complications:       Past Surgical History:   Procedure Laterality Date    MOUTH SURGERY      [place] teeth        Birth information:  YOB: 2026   Time of birth: 2:37 PM   Sex: male   Delivery type: Vaginal, Spontaneous   Birth Weight: 3480 g (7 lb 10.8 oz)   Percent of Weight Change: 0%     Gestational Age: 38w1d   [unfilled]    Assessment     Breast and nipple assessment: large breast     Assessment: normal assessment    Feeding assessment: feeding well  LATCH:  Latch: Grasps breast, tongue down, lips flanged, rhythmic sucking   Audible Swallowing: A few with stimulation   Type of Nipple: Everted (After stimulation)   Comfort (Breast/Nipple): Soft/non-tender   Hold (Positioning): Partial assist, teach one side, mother does other, staff holds   LATCH Score: 8        Having latch problems? No   Position(s) Used Football;Cross Cradle   Breasts/Nipples   Left Breast Soft;Other (Comment)  (Large)   Right Breast Soft;Other (Comment)  (Large)   Left Nipple Everted   Right Nipple Everted   Breastfeeding Progress Not yet established;Breastfeeding well   Breast Pump   Pump 3  (Momcozy)   Patient Follow-Up   Lactation Consult Status 2   Follow-Up Type Inpatient;Call as needed   Other OB Lactation Documentation    Additional Problem Noted Large breasts,  utilize tea-cup hold in football. Edc. on alignment nipple to nose and plant baby's chin. Demonstrated with adequate teach-back manual flange of upper and lower lips. RSB & DC bookletes reviewed.       Feeding recommendations:  breast feed on demand  Mother and baby brought into position for football hold on right breast. Mother has large breasts so tea-cup hold was used. Baby aligned nipple to nose and latch obtained. Baby noted to have both upper and lower lips flanged in. Reviewed with mother how to manually flange both lips.     Reviewed recommendations to EBF for first 6 months of life, meet early hunger cues and offer both breasts with each feeding.     RSB & DC booklets provided and reviewed.   Mother has Mom-cozy breast pump from insurance.     Encouraged to call with any questions or for any support.     Information on hand expression given. Discussed benefits of knowing how to manually express breast including stimulating milk supply, softening nipple for latch and evacuating breast in the event of engorgement.    Mom is encouraged to place baby skin to skin for feedings. Skin to skin education provided for baby placement on mother's chest, baby only in diaper, blankets below shoulders on baby's back. Skin to skin is encouraged to continue at home for feedings and between feedings.      - Start feedings on breast that last feeding ended   - allow no more than 3 hours between breast feeding sessions   - time between feedings is counted from the beginning of the first feed to the beginning of the next feeding session    Reviewed early signs of hunger, including tensing of hands and shoulders - no need to wait for open eyes.  Crying is a late hunger sign.  If baby is crying, soothe baby first and then attempt to latch.  Reviewed normal sucking patterns: transition from stimulation to nutritive to release or non-nutritive. The goal is to see and hear lots of swallowing.    Reviewed normal nursing pattern:  infant could latch on one breast up to 30 minutes or until releases on own. Signs of satiation is open hand with fingers that do not grab your finger.  Discussed difference in sensation of non-nutritive v nutritive sucking    Met with mother. Provided mother with Ready, Set, Baby booklet.    Discussed Skin to Skin contact an benefits to mom and baby.  Talked about the delay of the first bath until baby has adjusted. Spoke about the benefits of rooming in. Feeding on cue and what that means for recognizing infant's hunger. Avoidance of pacifiers for the first month discussed. Talked about exclusive breastfeeding for the first 6 months.    Positioning and latch reviewed as well as showing images of other feeding positions.  Discussed the properties of a good latch in any position. Reviewed hand/manual expression.  Discussed s/s that baby is getting enough milk and some s/s that breastfeeding dyad may need further help.    Gave information on common concerns, what to expect the first few weeks after delivery, preparing for other caregivers, and how partners can help. Resources for support also provided.    Encouraged parents to call for assistance, questions, and concerns about breastfeeding.  Extension provided.      Ana Monte RN 2/29/2024 8:01 PM

## 2024-03-01 NOTE — PROGRESS NOTES
Obstetrics Progress Note  Elizabeth Camacho 23 y.o. female MRN: [id number]  Unit/Bed#: -01 Encounter: [id number]    Assessment/Plan:    Postpartum Day #1 s/p , currently stable. Baby in room. By issue:  *  (spontaneous vaginal delivery)  Assessment & Plan       Continue routine post partum care  Pain well controlled: tylenol/motrin scheduled  Lochia within normal limits: continue to monitor   OOB: as able, encourage ambulation  Passing flatus  Voiding spontaneously  Breastfeeding  Baby in: room  Dispo: anticipate d/c home PPD1-2      Susceptible to varicella (non-immune), currently pregnant  Assessment & Plan  Offer varivax postpartum    Gestational hypertension  Assessment & Plan  CBC and CMP wnl, P/C 0.15  Systolic (24hrs), Av , Min:104 , Max:188   Diastolic (24hrs), Av, Min:55, Max:95          Subjective/Objective     Subjective:   No acute events overnight. Pain: controlled. Tolerating PO: yes. Voiding: spontaneously. Flatus: passing. Ambulating: yes. Chest pain: no. Shortness of breath: no. Leg pain: no. Lochia: within normal limits. Baby in room, feeding via breast.    Objective:   Vitals:   Temp:  [97.5 °F (36.4 °C)-99.8 °F (37.7 °C)] 97.5 °F (36.4 °C)  HR:  [] 74  Resp:  [16-18] 16  BP: (104-188)/(55-95) 104/55       Intake/Output Summary (Last 24 hours) at 3/1/2024 0659  Last data filed at 2024  Gross per 24 hour   Intake --   Output 1300 ml   Net -1300 ml       Lab Results   Component Value Date    WBC 7.70 2024    HGB 12.9 2024    HCT 38.7 2024    MCV 89 2024     2024       Physical Exam:   General: alert and oriented x3, in no apparent distress  Cardiovascular: regular rate  Pulmonary: normal effort  Abdomen: Soft, non-tender, non-distended, no rebound or guarding. Uterine fundus firm and non-tender, at the umbilicus  Incision: clean/dry/intact  Extremities: Non tender with no edema      Katherine F. Wilson, MD  PGY-I, OBGYN  3/1/2024,  6:59 AM

## 2024-03-01 NOTE — PLAN OF CARE
Problem: PAIN - ADULT  Goal: Verbalizes/displays adequate comfort level or baseline comfort level  Description: Interventions:  - Encourage patient to monitor pain and request assistance  - Assess pain using appropriate pain scale  - Administer analgesics based on type and severity of pain and evaluate response  - Implement non-pharmacological measures as appropriate and evaluate response  - Consider cultural and social influences on pain and pain management  - Notify physician/advanced practitioner if interventions unsuccessful or patient reports new pain  Outcome: Progressing     Problem: INFECTION - ADULT  Goal: Absence or prevention of progression during hospitalization  Description: INTERVENTIONS:  - Assess and monitor for signs and symptoms of infection  - Monitor lab/diagnostic results  - Monitor all insertion sites, i.e. indwelling lines, tubes, and drains  - Monitor endotracheal if appropriate and nasal secretions for changes in amount and color  - [place] appropriate cooling/warming therapies per order  - Administer medications as ordered  - Instruct and encourage patient and family to use good hand hygiene technique  - Identify and instruct in appropriate isolation precautions for identified infection/condition  Outcome: Progressing  Goal: Absence of fever/infection during neutropenic period  Description: INTERVENTIONS:  - Monitor WBC    Outcome: Progressing     Problem: SAFETY ADULT  Goal: Patient will remain free of falls  Description: INTERVENTIONS:  - Educate patient/family on patient safety including physical limitations  - Instruct patient to call for assistance with activity   - Consult OT/PT to assist with strengthening/mobility   - Keep Call bell within reach  - Keep bed low and locked with side rails adjusted as appropriate  - Keep care items and personal belongings within reach  - Initiate and maintain comfort rounds  - Make Fall Risk Sign visible to staff  - Offer Toileting every  Hours,  in advance of need  - Initiate/Maintain alarm  - Obtain necessary fall risk management equipment:   - Apply yellow socks and bracelet for high fall risk patients  - Consider moving patient to room near nurses station  Outcome: Progressing  Goal: Maintain or return to baseline ADL function  Description: INTERVENTIONS:  -  Assess patient's ability to carry out ADLs; assess patient's baseline for ADL function and identify physical deficits which impact ability to perform ADLs (bathing, care of mouth/teeth, toileting, grooming, dressing, etc.)  - Assess/evaluate cause of self-care deficits   - Assess range of motion  - Assess patient's mobility; develop plan if impaired  - Assess patient's need for assistive devices and provide as appropriate  - Encourage maximum independence but intervene and supervise when necessary  - Involve family in performance of ADLs  - Assess for home care needs following discharge   - Consider OT consult to assist with ADL evaluation and planning for discharge  - Provide patient education as appropriate  Outcome: Progressing  Goal: Maintains/Returns to pre admission functional level  Description: INTERVENTIONS:  - Perform AM-PAC 6 Click Basic Mobility/ Daily Activity assessment daily.  - Set and communicate daily mobility goal to care team and patient/family/caregiver.   - Collaborate with rehabilitation services on mobility goals if consulted  - Perform Range of Motion  times a day.  - Reposition patient every  hours.  - Dangle patient  times a day  - Stand patient  times a day  - Ambulate patient  times a day  - Out of bed to chair   Problem: BIRTH - VAGINAL/ SECTION  Goal: Fetal and maternal status remain reassuring during the birth process  Description: INTERVENTIONS:  - Monitor vital signs  - Monitor fetal heart rate  - Monitor uterine activity  - Monitor labor progression (vaginal delivery)  - DVT prophylaxis  - Antibiotic prophylaxis  Outcome: Completed  Goal: Emotionally  satisfying birthing experience for mother/fetus  Description: Interventions:  - Assess, plan, implement and evaluate the nursing care given to the patient in labor  - Advocate the philosophy that each childbirth experience is a unique experience and support the family's chosen level of involvement and control during the labor process   - Actively participate in both the patient's and family's teaching of the birth process  - Consider cultural, Mandaen and age-specific factors and plan care for the patient in labor  Outcome: Completed    times a day   - Out of bed for meals  times a day  - Out of bed for toileting  - Record patient progress and toleration of activity level   Outcome: Progressing     Problem: DISCHARGE PLANNING  Goal: Discharge to home or other facility with appropriate resources  Description: INTERVENTIONS:  - Identify barriers to discharge w/patient and caregiver  - Arrange for needed discharge resources and transportation as appropriate  - Identify discharge learning needs (meds, wound care, etc.)  - Arrange for interpretive services to assist at discharge as needed  - Refer to Case Management Department for coordinating discharge planning if the patient needs post-hospital services based on physician/advanced practitioner order or complex needs related to functional status, cognitive ability, or social support system  Outcome: Progressing     Problem: POSTPARTUM  Goal: Experiences normal postpartum course  Description: INTERVENTIONS:  - Monitor maternal vital signs  - Assess uterine involution and lochia  Outcome: Progressing  Goal: Appropriate maternal -  bonding  Description: INTERVENTIONS:  - Identify family support  - Assess for appropriate maternal/infant bonding   -Encourage maternal/infant bonding opportunities  - Referral to  or  as needed  Outcome: Progressing  Goal: Establishment of infant feeding pattern  Description: INTERVENTIONS:  - Assess  breast/bottle feeding  - Refer to lactation as needed  Outcome: Progressing  Goal: Incision(s), wounds(s) or drain site(s) healing without S/S of infection  Description: INTERVENTIONS  - Assess and document dressing, incision, wound bed, drain sites and surrounding tissue  - Provide patient and family education  - Perform skin care/dressing changes every   Outcome: Progressing

## 2024-03-01 NOTE — PLAN OF CARE
Problem: PAIN - ADULT  Goal: Verbalizes/displays adequate comfort level or baseline comfort level  Description: Interventions:  - Encourage patient to monitor pain and request assistance  - Assess pain using appropriate pain scale  - Administer analgesics based on type and severity of pain and evaluate response  - Implement non-pharmacological measures as appropriate and evaluate response  - Consider cultural and social influences on pain and pain management  - Notify physician/advanced practitioner if interventions unsuccessful or patient reports new pain  3/1/2024 1526 by Magdalene Babb RN  Outcome: Completed  3/1/2024 0920 by Magdalene Babb RN  Outcome: Adequate for Discharge     Problem: INFECTION - ADULT  Goal: Absence or prevention of progression during hospitalization  Description: INTERVENTIONS:  - Assess and monitor for signs and symptoms of infection  - Monitor lab/diagnostic results  - Monitor all insertion sites, i.e. indwelling lines, tubes, and drains  - Monitor endotracheal if appropriate and nasal secretions for changes in amount and color  - [place] appropriate cooling/warming therapies per order  - Administer medications as ordered  - Instruct and encourage patient and family to use good hand hygiene technique  - Identify and instruct in appropriate isolation precautions for identified infection/condition  3/1/2024 1526 by Magdalene Babb RN  Outcome: Completed  3/1/2024 0920 by Magdalene Babb RN  Outcome: Adequate for Discharge  Goal: Absence of fever/infection during neutropenic period  Description: INTERVENTIONS:  - Monitor WBC    3/1/2024 1526 by Magdalene Babb RN  Outcome: Completed  3/1/2024 0920 by Magdalene Babb RN  Outcome: Adequate for Discharge     Problem: SAFETY ADULT  Goal: Patient will remain free of falls  Description: INTERVENTIONS:  - Educate patient/family on patient safety including physical limitations  - Instruct patient to call for assistance with activity   -  Consult OT/PT to assist with strengthening/mobility   - Keep Call bell within reach  - Keep bed low and locked with side rails adjusted as appropriate  - Keep care items and personal belongings within reach  - Initiate and maintain comfort rounds  - Make Fall Risk Sign visible to staff  - Offer Toileting every  Hours, in advance of need  - Initiate/Maintain alarm  - Obtain necessary fall risk management equipment:   - Apply yellow socks and bracelet for high fall risk patients  - Consider moving patient to room near nurses station  3/1/2024 1526 by Magdalene Babb RN  Outcome: Completed  3/1/2024 0920 by Magdalene Babb RN  Outcome: Adequate for Discharge  Goal: Maintain or return to baseline ADL function  Description: INTERVENTIONS:  -  Assess patient's ability to carry out ADLs; assess patient's baseline for ADL function and identify physical deficits which impact ability to perform ADLs (bathing, care of mouth/teeth, toileting, grooming, dressing, etc.)  - Assess/evaluate cause of self-care deficits   - Assess range of motion  - Assess patient's mobility; develop plan if impaired  - Assess patient's need for assistive devices and provide as appropriate  - Encourage maximum independence but intervene and supervise when necessary  - Involve family in performance of ADLs  - Assess for home care needs following discharge   - Consider OT consult to assist with ADL evaluation and planning for discharge  - Provide patient education as appropriate  3/1/2024 1526 by Magdalene Babb RN  Outcome: Completed  3/1/2024 0920 by Magdalene Babb RN  Outcome: Adequate for Discharge  Goal: Maintains/Returns to pre admission functional level  Description: INTERVENTIONS:  - Perform AM-PAC 6 Click Basic Mobility/ Daily Activity assessment daily.  - Set and communicate daily mobility goal to care team and patient/family/caregiver.   - Collaborate with rehabilitation services on mobility goals if consulted  - Perform Range of Motion   times a day.  - Reposition patient every hours.  - Dangle patient  times a day  - Stand patient  times a day  - Ambulate patient  times a day  - Out of bed to chair times a day   - Out of bed for meals  times a day  - Out of bed for toileting  - Record patient progress and toleration of activity level   3/1/2024 1526 by Magdalene Babb RN  Outcome: Completed  3/1/2024 0920 by Magdalene Babb RN  Outcome: Adequate for Discharge     Problem: DISCHARGE PLANNING  Goal: Discharge to home or other facility with appropriate resources  Description: INTERVENTIONS:  - Identify barriers to discharge w/patient and caregiver  - Arrange for needed discharge resources and transportation as appropriate  - Identify discharge learning needs (meds, wound care, etc.)  - Arrange for interpretive services to assist at discharge as needed  - Refer to Case Management Department for coordinating discharge planning if the patient needs post-hospital services based on physician/advanced practitioner order or complex needs related to functional status, cognitive ability, or social support system  3/1/2024 1526 by Magdalene Babb RN  Outcome: Completed  3/1/2024 0920 by Magdalene Babb RN  Outcome: Adequate for Discharge     Problem: POSTPARTUM  Goal: Experiences normal postpartum course  Description: INTERVENTIONS:  - Monitor maternal vital signs  - Assess uterine involution and lochia  3/1/2024 1526 by Magdalene Babb RN  Outcome: Completed  3/1/2024 0920 by Magdalene Babb RN  Outcome: Adequate for Discharge  Goal: Appropriate maternal -  bonding  Description: INTERVENTIONS:  - Identify family support  - Assess for appropriate maternal/infant bonding   -Encourage maternal/infant bonding opportunities  - Referral to  or  as needed  3/1/2024 1526 by Magdalene Babb RN  Outcome: Completed  3/1/2024 0920 by Magdalene Babb RN  Outcome: Adequate for Discharge  Goal: Establishment of infant feeding  pattern  Description: INTERVENTIONS:  - Assess breast/bottle feeding  - Refer to lactation as needed  3/1/2024 1526 by Magdalene Babb RN  Outcome: Completed  3/1/2024 0920 by Magdalene Babb RN  Outcome: Adequate for Discharge  Goal: Incision(s), wounds(s) or drain site(s) healing without S/S of infection  Description: INTERVENTIONS  - Assess and document dressing, incision, wound bed, drain sites and surrounding tissue  - Provide patient and family education  - Perform skin care/dressing changes every  3/1/2024 1526 by Magdalene Babb RN  Outcome: Completed  3/1/2024 0920 by Magdalene Babb RN  Outcome: Adequate for Discharge     Problem: ALTERATION IN THE BREAST  Goal: Optimize infant feeding at the breast  Description: INTERVENTIONS:  - Latch, breast and nipple assessment  - Assess prior breast feeding history  - Hand expression of breast milk  - For cracked, bleeding and or sore nipples reassess latch, treat damaged nipple  -Educate mother on feeding cues  -Positioning/latch techniques  3/1/2024 1526 by Magdalene Babb RN  Outcome: Completed  3/1/2024 0920 by Magdalene Babb RN  Outcome: Adequate for Discharge     Problem: INADEQUATE LATCH, SUCK OR SWALLOW  Goal: Demonstrate ability to latch and sustain latch, audible swallowing and satiety  Description: INTERVENTIONS:  - Assess oral anatomy, notify Physician/AP for abnormal findings  - Establish milk expression  - Maximize feeding opportunity (skin to skin, behavioral state)  - Position/latch techniques  - Discourage use of pacifier-artificial nipple  - Mechanical pumping  - Nipple Shield  - Supplemental formula feeding (Physician/AP order)  - Alternative feeding method  3/1/2024 1526 by Magdalene Babb RN  Outcome: Completed  3/1/2024 0920 by Magdalene Babb RN  Outcome: Adequate for Discharge

## 2024-03-01 NOTE — ANESTHESIA POSTPROCEDURE EVALUATION
Post-Op Assessment Note    CV Status:  Stable  Pain Score: 0    Pain management: adequate      Post-op block assessment: no complications and catheter intact   Mental Status:  Alert and awake   Hydration Status:  Euvolemic   PONV Controlled:  Controlled   Airway Patency:  Patent     Post Op Vitals Reviewed: Yes    No anethesia notable event occurred.    Staff: CRNA               BP      Temp      Pulse     Resp      SpO2

## 2024-03-01 NOTE — LACTATION NOTE
This note was copied from a baby's chart.  Met with parents to follow up and discuss the Breastfeeding Discharge Booklet.     Mother discussed that she is breastfeeding well. Baby cluster fed during the night.    Baby is currently under 1% weight loss, having appropriate output and 24 hour bilirubin will be checked soon.    Discussed the importance of ensuring that baby feeds 8-12x in 24 hours and that baby has 6 wet diapers or more that are becoming more dilute as well as soiled diapers that are transitioning demonstrated by color change from meconium to a yellow/gold seedy loose stool by day 5.    Mother was given resources to look up medications to ensure they are safe with breastfeeding, by communicating with the [facility], [facility], [facility] as well as using [employer] (assisted mother to pin to home screen on personal phone).    Discussed engorgement time frame (when mature milk comes in) and management as well as how to deal with conditions that may occur while breastfeeding (plugged ducts, milk blebs and mastitis) and when is appropriate to communicate with her OB/GYN and/or a lactation consultant.    Discussed handouts on how to set up a pump, how to cycle (stimulation vs expression phases during a pumping session), importance of flange fit and trying different sizes to ensure best fit, milk storage and how to properly clean parts. Discussed handouts for tips on pumping when returning to work and paced bottle feeding.    Discussed community resources for continued support in breastfeeding once discharged home.     Parents were encouraged to call for further questions that arise prior to discharge.

## 2024-03-01 NOTE — PLAN OF CARE
Problem: PAIN - ADULT  Goal: Verbalizes/displays adequate comfort level or baseline comfort level  Description: Interventions:  - Encourage patient to monitor pain and request assistance  - Assess pain using appropriate pain scale  - Administer analgesics based on type and severity of pain and evaluate response  - Implement non-pharmacological measures as appropriate and evaluate response  - Consider cultural and social influences on pain and pain management  - Notify physician/advanced practitioner if interventions unsuccessful or patient reports new pain  Outcome: Adequate for Discharge     Problem: INFECTION - ADULT  Goal: Absence or prevention of progression during hospitalization  Description: INTERVENTIONS:  - Assess and monitor for signs and symptoms of infection  - Monitor lab/diagnostic results  - Monitor all insertion sites, i.e. indwelling lines, tubes, and drains  - Monitor endotracheal if appropriate and nasal secretions for changes in amount and color  - Edison appropriate cooling/warming therapies per order  - Administer medications as ordered  - Instruct and encourage patient and family to use good hand hygiene technique  - Identify and instruct in appropriate isolation precautions for identified infection/condition  Outcome: Adequate for Discharge  Goal: Absence of fever/infection during neutropenic period  Description: INTERVENTIONS:  - Monitor WBC    Outcome: Adequate for Discharge     Problem: SAFETY ADULT  Goal: Patient will remain free of falls  Description: INTERVENTIONS:  - Educate patient/family on patient safety including physical limitations  - Instruct patient to call for assistance with activity   - Consult OT/PT to assist with strengthening/mobility   - Keep Call bell within reach  - Keep bed low and locked with side rails adjusted as appropriate  - Keep care items and personal belongings within reach  - Initiate and maintain comfort rounds  - Make Fall Risk Sign visible to  staff  - Offer Toileting every  Hours, in advance of need  - Initiate/Maintain alarm  - Obtain necessary fall risk management equipment:   - Apply yellow socks and bracelet for high fall risk patients  - Consider moving patient to room near nurses station  Outcome: Adequate for Discharge  Goal: Maintain or return to baseline ADL function  Description: INTERVENTIONS:  -  Assess patient's ability to carry out ADLs; assess patient's baseline for ADL function and identify physical deficits which impact ability to perform ADLs (bathing, care of mouth/teeth, toileting, grooming, dressing, etc.)  - Assess/evaluate cause of self-care deficits   - Assess range of motion  - Assess patient's mobility; develop plan if impaired  - Assess patient's need for assistive devices and provide as appropriate  - Encourage maximum independence but intervene and supervise when necessary  - Involve family in performance of ADLs  - Assess for home care needs following discharge   - Consider OT consult to assist with ADL evaluation and planning for discharge  - Provide patient education as appropriate  Outcome: Adequate for Discharge  Goal: Maintains/Returns to pre admission functional level  Description: INTERVENTIONS:  - Perform AM-PAC 6 Click Basic Mobility/ Daily Activity assessment daily.  - Set and communicate daily mobility goal to care team and patient/family/caregiver.   - Collaborate with rehabilitation services on mobility goals if consulted  - Perform Range of Motion times a day.  - Reposition patient every  hours.  - Dangle patient  times a day  - Stand patient times a day  - Ambulate patient  times a day  - Out of bed to chair  times a day   - Out of bed for meals  times a day  - Out of bed for toileting  - Record patient progress and toleration of activity level   Outcome: Adequate for Discharge     Problem: DISCHARGE PLANNING  Goal: Discharge to home or other facility with appropriate resources  Description: INTERVENTIONS:  -  Identify barriers to discharge w/patient and caregiver  - Arrange for needed discharge resources and transportation as appropriate  - Identify discharge learning needs (meds, wound care, etc.)  - Arrange for interpretive services to assist at discharge as needed  - Refer to Case Management Department for coordinating discharge planning if the patient needs post-hospital services based on physician/advanced practitioner order or complex needs related to functional status, cognitive ability, or social support system  Outcome: Adequate for Discharge     Problem: POSTPARTUM  Goal: Experiences normal postpartum course  Description: INTERVENTIONS:  - Monitor maternal vital signs  - Assess uterine involution and lochia  Outcome: Adequate for Discharge  Goal: Appropriate maternal -  bonding  Description: INTERVENTIONS:  - Identify family support  - Assess for appropriate maternal/infant bonding   -Encourage maternal/infant bonding opportunities  - Referral to  or  as needed  Outcome: Adequate for Discharge  Goal: Establishment of infant feeding pattern  Description: INTERVENTIONS:  - Assess breast/bottle feeding  - Refer to lactation as needed  Outcome: Adequate for Discharge  Goal: Incision(s), wounds(s) or drain site(s) healing without S/S of infection  Description: INTERVENTIONS  - Assess and document dressing, incision, wound bed, drain sites and surrounding tissue  - Provide patient and family education  - Perform skin care/dressing changes every   Outcome: Adequate for Discharge     Problem: ALTERATION IN THE BREAST  Goal: Optimize infant feeding at the breast  Description: INTERVENTIONS:  - Latch, breast and nipple assessment  - Assess prior breast feeding history  - Hand expression of breast milk  - For cracked, bleeding and or sore nipples reassess latch, treat damaged nipple  -Educate mother on feeding cues  -Positioning/latch techniques  Outcome: Adequate for Discharge     Problem:  INADEQUATE LATCH, SUCK OR SWALLOW  Goal: Demonstrate ability to latch and sustain latch, audible swallowing and satiety  Description: INTERVENTIONS:  - Assess oral anatomy, notify Physician/AP for abnormal findings  - Establish milk expression  - Maximize feeding opportunity (skin to skin, behavioral state)  - Position/latch techniques  - Discourage use of pacifier-artificial nipple  - Mechanical pumping  - Nipple Shield  - Supplemental formula feeding (Physician/AP order)  - Alternative feeding method  Outcome: Adequate for Discharge

## 2024-03-03 ENCOUNTER — NURSE TRIAGE (OUTPATIENT)
Dept: OTHER | Facility: OTHER | Age: 24
End: 2024-03-03

## 2024-03-03 NOTE — TELEPHONE ENCOUNTER
"Reason for Disposition   Normal postpartum bleeding    Answer Assessment - Initial Assessment Questions  1. ONSET: \"Describe your bleeding: is it getting worse, staying the same, improving, or stopping and starting?\"      Improving    2. AMOUNT: \"How much bleeding are you having today?\"      - SPOTTING: spotting, or pinkish / brownish mucous discharge; does not fill panti-liner or pad     - MILD:  less than 1 pad / hour; less than patient's usual menstrual bleeding    - MODERATE: 1-2 pads / hour; 1 menstrual cup every 6 hours; small-medium blood clots (e.g., pea, grape, small coin)    - SEVERE: soaking 2 or more pads/hour for 2 or more hours; 1 menstrual cup every 2 hours; bleeding not contained by pads or continuous red blood from vagina; large blood clots (e.g., golf ball, large coin)       Mild - less than 1 pad/hour. Concerned about a blood clot that was slightly larger than a quarter    3. ABDOMINAL PAIN: \"Do you have any pain?\" \"How bad is the pain?\" \"What does it keep you from doing?\"      - MILD -  doesn't interfere with normal activities, abdomen soft and not tender to touch      - MODERATE - interferes with normal activities or awakens from sleep, tender to touch      - SEVERE - excruciating pain, doubled over, unable to do any normal activities        1/10 cramping    4. HORMONES: \"Are you taking any birth control medications?\" (e.g., birth control pills, Depo-Provera)      Denies    5. BLOOD THINNERS: \"Do you take any blood thinners?\" (e.g., coumadin, aspirin)      No    6. OTHER SYMPTOMS: \"Do you have any other symptoms?\" (e.g., fever, chills, dizziness)      No other symptoms     7. DELIVERY DATE: \"When was your delivery date?\" \"Vaginal delivery or ?\"       vaginal delivery    8. BREASTFEEDING: \"Are you breastfeeding?\"      Yes    Protocols used: Postpartum - Vaginal Bleeding and Lochia-ADULT-AH      Care advice given. Patient verbalized understanding.    Please call patient back to " schedule post-delivery appointment in the office.

## 2024-03-07 LAB — PLACENTA IN STORAGE: NORMAL

## 2024-03-08 ENCOUNTER — OFFICE VISIT (OUTPATIENT)
Dept: POSTPARTUM | Facility: CLINIC | Age: 24
End: 2024-03-08

## 2024-03-08 NOTE — PROGRESS NOTES
INITIAL BREAST FEEDING EVALUATION    Informant/Relationship: Elizabeth (mom/self), Rey (FOB)     Discussion of General Lactation Issues: Kate latch is painful on the right side most of the time, sometimes painful on the left.     Infant is 8 days old today.        History:  Fertility Problem:no  Breast changes:yes - areola got darker  :  induced for low fluids and calcium deposits on the placenta. Induction was 24 hours, pushed for 30 minutes. Progressed well, no complications.   Full term:yes - 38 and 1    labor:no  First nursing/attempt < 1 hour after birth:yes - good latch right at delivery   Skin to skin following delivery:yes - immediately   Breast changes after delivery:yes - day 3 postpartum   Rooming in (infant in room with mother with exception of procedures, eg. Circumcision: yes - other than the circ   Blood sugar issues:no  NICU stay:no  Jaundice:no  Phototherapy:no  Supplement given: (list supplement and method used as well as reason(s):no    Past Medical History:   Diagnosis Date    Herpes     type 1 vaginally    Varicella     immunized         Current Outpatient Medications:     acetaminophen (TYLENOL) 325 mg tablet, Take 2 tablets (650 mg total) by mouth every 4 (four) hours as needed for mild pain, Disp: , Rfl:     benzocaine-menthol-lanolin-aloe (DERMOPLAST) 20-0.5 % topical spray, Apply 1 Application topically every 6 (six) hours as needed for irritation, Disp: , Rfl:     calcium carbonate (TUMS) 500 mg chewable tablet, Chew 2 tablets (1,000 mg total) daily as needed for indigestion or heartburn, Disp: , Rfl:     docusate sodium (COLACE) 100 mg capsule, Take 1 capsule (100 mg total) by mouth 2 (two) times a day, Disp: , Rfl:     ibuprofen (MOTRIN) 600 mg tablet, Take 1 tablet (600 mg total) by mouth every 6 (six) hours, Disp: , Rfl:     Prenatal MV-Min-Fe Fum-FA-DHA (PRENATAL 1 PO), Take by mouth, Disp: , Rfl:     No Known Allergies    Social History     Substance and Sexual  Activity   Drug Use Not Currently       Social History     Interval Breastfeeding History:    Frequency of breast feeding: every 3 hours day and night, feeding more often today.   Does mother feel breastfeeding is effective: Yes  Does infant appear satisfied after nursing:Yes  Stooling pattern normal: Yes  Urinating frequently:Yes  Using shield or shells: No    Alternative/Artificial Feedings:   Bottle: No  Cup: No  Syringe/Finger: No           Formula Type: none                     Amount: n/a            Breast Milk:                      Amount: only directly from the breast               Elimination Problems: No      Equipment:    Pump            Type: Haakaa using in the morning after Benny feeds, Momcozy, Medela pump.             Frequency of Use: each morning     Collecting about 2 oz each time     Equipment Problems: no    Mom:  Breast: large, pendulous, soft, non tender, full-feeling throughout   Nipple Assessment in General: tender, everted, intact   Mother's Awareness of Feeding Cues                 Recognizes: Yes                  Verbalizes: Yes  Support System: good support   History of Breastfeeding: first time breastfeeding   Changes/Stressors/Violence: Painful latch at times.   Concerns/Goals: Latching with more comfortably consistently. She'd like to exclusively breastfeed for a year. Plans to incorporate occasional bottles.     Problems with Mom: none     Physical Exam  Constitutional:       Appearance: Normal appearance.   HENT:      Head: Normocephalic.   Pulmonary:      Effort: Pulmonary effort is normal.   Musculoskeletal:         General: Normal range of motion.      Cervical back: Normal range of motion.   Neurological:      General: No focal deficit present.      Mental Status: She is alert and oriented to person, place, and time.   Skin:     General: Skin is warm.      Capillary Refill: Capillary refill takes less than 2 seconds.   Psychiatric:         Mood and Affect: Mood normal.          Behavior: Behavior normal.         Thought Content: Thought content normal.         Infant:  Behaviors: Alert, fussy before the feeding   Color: Jaundice  Birth weight: 3480 g  Current weight: 3315 g     Problems with infant: short frenulum, limited tongue mobility on exam       General Appearance:  Alert, active, no distress                             Head:  Normocephalic, AFOF, sutures opposed                             Eyes:  Conjunctiva clear, no drainage                              Ears:  Normally placed, no anomolies                             Nose:  Septum intact, no drainage or erythema                           Mouth:  No lesions. Tongue is flat when crying, edges lifted. Poor lateralization in either direction, notched tongue tip with any movement. Full cup on finger, maintains cup with slight pressure on mandible, edges lift higher than tip. Manual lift shows thick band of skin <1cm in length connected behind alveolar ridge and just back from the tongue tip. Tongue is v-shaped.                     Neck:  Supple, symmetrical, trachea midline                 Respiratory:  No grunting, flaring, retractions, breath sounds clear and equal            Cardiovascular:  Regular rate and rhythm. No murmur. Adequate perfusion/capillary refill. Femoral pulse present                    Abdomen:   Soft, non-tender, no masses, bowel sounds present, no HSM             Genitourinary:  Normal male, testes descended, no discharge, swelling, or pain, anus patent                          Spine:   No abnormalities noted        Musculoskeletal:  Full range of motion          Skin/Hair/Nails:   Skin warm, dry, and intact, no rashes or abnormal dyspigmentation or lesions                Neurologic:   No abnormal movement, tone appropriate for gestational age    Freddie Assessment for Lingual Frenulum Function    Appearance Items Function Items   Appearance of tongue when lifted  1: Slight cleft in tip apparent    "Lateralization  1: Body of tongue but not tongue tip   Elasticity of frenulum  1: Moderately elastic   Lift of tongue  0: Tip stays at lower alveloar ridge or rises to mid-mouth only with jaw closure     Length of lingual frenulum when tongue lifted  lingual frenulum length: 0: < 1cm     Extension of tongue  1: Tip over lower gum only   Attachment of lingual frenulum to tongue  2: Posterior to tip   Spread of anterior tongue  2: Complete   Attachment of lingual frenulum to inferior alveolar ridge  2: Attached to floor of mouth or well below ridge Cupping  1: Side edges only, moderate cup   Ankyloglossia Grading:  Class I: mild, 12-16 mm  Class II: moderate, 8-11 mm  Class III: severe, 3-7 mm  ClassIV: complete, less than 3 mm Peristalsis  1: Partial, originating posterior to tip       SCORE:    Appearance: 6 (<8=ankyloglossia)  Function: 7 (<11=ankyloglossia) Snapback  2: None         Latch:  Efficiency:               Lips Flanged: Yes              Depth of latch: wide              Audible Swallow: Yes, he sucks about 3-5 times and takes breaks often.               Visible Milk: Yes              Wide Open/ Asymmetrical: Yes              Suck Swallow Cycle: Breathing: yes, Coordinated: \"chomping\" motion often   Nipple Assessment after latch: Normal: elongated/eraser, no discoloration and no damage noted.  Latch Problems: He gapes and obtains a deep latch. Lips flanged. Chomping jaw motion often. Mom switches to the opposite breast and latched him independently. Denies pain with positioning adjustments. He is offered both breasts and appears much more content than before the feeding.     Weighted feeding - after nursing on both breasts for about 20 minutes he is +60 g     Position:  Infant's Ergonomics/Body               Body Alignment: Yes               Head Supported: Yes               Close to Mom's body/ Lifted/ Supported: Yes               Mom's Ergonomics/Body: Yes                           Supported: " Yes                           Sitting Back: Yes                           Brings Baby to her breast: Yes  Positioning Problems: Reviewed BN hold, Mom returns demonstration very well.       Handouts:   Latch checklist, breast compressions     Education:  Reviewed Latch: importance of deep latch without pain.   Reviewed Positioning for Dyad: proper alignment and head angle when positioning at the breast   Reviewed Frequency/Supply & Demand: offer the breast at each feeding, pump if baby is not latching and effective transferring milk.   Reviewed Infant:Cues and varied States of Awareness: watch for hunger cues, feed on demand. If baby seems satisfied at the breast (calm, relaxed sleeping, breasts are softer) no need to pump or supplement   Reviewed Infant Elimination: goal of 6+ wets and 2-3 stools per day   Reviewed Alternative/Artificial Feedings: paced bottle feeding technique demonstrated  Reviewed Mom/Breast care: gentle handling of the breast at all times, discussed lymphatic drainage and reverse pressure softening, as well as tips for healing sore nipples.    Reviewed Equipment: Hand pump and electric pump general guidance, Discussed proper flange fit, how to measure        Plan:    Reassurance provided that baby is growing well at this time. Cont with positioning adjustments and watch for signs of effective feeding. Pump only if wanting to replace feeding at the breast with bottle feeding or if latching becomes painful. Gentle handling of the breast at all times to preserve integrity.  Contact [facility] for breastfeeding support as needed or ongoing concerns with latching comfort and milk transfer. Follow up in 1 week.     I have spent 90 minutes with Patient and family today in which greater than 50% of this time was spent in counseling/coordination of care regarding Patient and family education.

## 2024-03-08 NOTE — PATIENT INSTRUCTIONS
-You're off to a great start with breastfeeding! Congrats on your sweet baby boy!   -Watch for signs throughout the feeding that Garvin is effectively removing milk. You will feel strong tugging, hear swallowing and will feel your breasts get softer after nursing.   -No need to pump if baby is latching and feeding well at the breast on demand.   -Pump only as needed for missed feedings at the breast or for uncomfortable engorgement, utilize flange fit and settings that are comfortable for you, pumping should not be painful!   -Healing techniques for nipples : expressed milk or nipple cream of choice and airflow, or apply cream and cover with a piece of wax or parchment paper over top in between feedings.   - Storing and Thawing Breast Milk  [employer] Breastfeeding Support ([place]da.gov)    -Follow up in one week for ongoing feeding support.

## 2024-03-15 ENCOUNTER — POSTPARTUM VISIT (OUTPATIENT)
Dept: OBGYN CLINIC | Facility: CLINIC | Age: 24
End: 2024-03-15

## 2024-03-15 VITALS
SYSTOLIC BLOOD PRESSURE: 122 MMHG | BODY MASS INDEX: 38.91 KG/M2 | HEIGHT: 70 IN | WEIGHT: 271.8 LBS | DIASTOLIC BLOOD PRESSURE: 70 MMHG

## 2024-03-15 PROCEDURE — 99024 POSTOP FOLLOW-UP VISIT: CPT | Performed by: OBSTETRICS & GYNECOLOGY

## 2024-03-15 NOTE — PROGRESS NOTES
Assessment/Plan     Normal postpartum exam.     1. Contraception: IUD - Mirena  2. Annual exam due in July.   3. Increase activity as tolerated, may resume all normal activity.  4. Lactation consult needed: no; if yes, [facility] information discussed.         Subjective   Chief Complaint   Patient presents with    Postpartum Care     Pt is here for a post partum care visit. Dlvd on 24 at 38w1d (7 lb 10.8 oz) M  Vag-Spont.  Baby boy! Benny   Form of feeding: Breast feeding and pumping   EPDS 1    Last pap neg pap 20, 23       Elizabeth Camacho is a 23 y.o.  female who presents for a postpartum visit.     Delivery Method: Vaginal, Spontaneous    Delivery Date and Time: 2024  2:37 PM   Delivery Attending: Chapis Watters   Gestational Age at delivery: 38w1d   Route of Delivery: Vaginal, Spontaneous       Admitting Diagnoses:   Patient Active Problem List   Diagnosis    Acquired kyphosis    38 weeks gestation of pregnancy    Obesity affecting pregnancy in third trimester    Gestational hypertension     (spontaneous vaginal delivery)    Susceptible to varicella (non-immune), currently pregnant       Gestational Diabetes: no  Pregnancy Complications: none    Anesthesia: Epidural ,     Delivery: Vaginal, Spontaneous  at 2024  2:37 PM   Laceration: Perineal: 1°  Repaired? Yes     Baby's Name: Brennen  Baby's Weight: 3480 g (7 lb 10.8 oz) ; 122.75     Apgar scores: 9  and 9  at 1 and 5 minutes, respectively  Breast or formula: Breastfeeding    Bleeding staining only. Bowel function: normal. Bladder function: normal. The patient is not having any pain.     Patient has not been sexually active. Desired contraception method is IUD.     Postpartum depression screening: negative. EPDS : 1. She denies depression/anxiety/trouble sleeping.     Last Pap :  ; no abnormalities      The following portions of the patient's history were reviewed and updated as appropriate: allergies,  "current medications, past family history, past medical history, past social history, past surgical history, and problem list.      Current Outpatient Medications:     Prenatal MV-Min-Fe Fum-FA-DHA (PRENATAL 1 PO), Take by mouth, Disp: , Rfl:     No Known Allergies    Review of Systems  Review of Systems   Constitutional:  Positive for fatigue.   HENT: Negative.     Respiratory: Negative.     Cardiovascular: Negative.    Gastrointestinal: Negative.    Genitourinary: Negative.    Neurological: Negative.    Psychiatric/Behavioral: Negative.           Objective      /70 (BP Location: Left arm, Patient Position: Sitting, Cuff Size: Standard)   Ht 5' 10\" (1.778 m)   Wt 123 kg (271 lb 12.8 oz)   LMP 06/07/2023 (Exact Date)   Breastfeeding Yes Comment: & pumping  BMI 39.00 kg/m²     Physical Exam  Constitutional:       Appearance: Normal appearance.   HENT:      Head: Normocephalic and atraumatic.   Eyes:      Extraocular Movements: Extraocular movements intact.      Conjunctiva/sclera: Conjunctivae normal.      Pupils: Pupils are equal, round, and reactive to light.   Pulmonary:      Effort: Pulmonary effort is normal.   Neurological:      General: No focal deficit present.      Mental Status: She is alert and oriented to person, place, and time.   Skin:     General: Skin is warm and dry.   Psychiatric:         Mood and Affect: Mood normal.         Behavior: Behavior normal.   Vitals and nursing note reviewed.         "

## 2024-03-17 NOTE — PROGRESS NOTES
I have reviewed the notes, assessments, and/or procedures performed by Chanel Sinclair RN, IBCLC, I concur with her/his documentation of Elizabeth Avila MD 03/17/24

## 2024-04-29 ENCOUNTER — PROCEDURE VISIT (OUTPATIENT)
Dept: OBGYN CLINIC | Facility: CLINIC | Age: 24
End: 2024-04-29
Payer: COMMERCIAL

## 2024-04-29 ENCOUNTER — TELEPHONE (OUTPATIENT)
Age: 24
End: 2024-04-29

## 2024-04-29 VITALS
HEIGHT: 70 IN | WEIGHT: 275 LBS | SYSTOLIC BLOOD PRESSURE: 120 MMHG | BODY MASS INDEX: 39.37 KG/M2 | DIASTOLIC BLOOD PRESSURE: 74 MMHG

## 2024-04-29 DIAGNOSIS — Z30.430 ENCOUNTER FOR IUD INSERTION: Primary | ICD-10-CM

## 2024-04-29 PROBLEM — O09.899 SUSCEPTIBLE TO VARICELLA (NON-IMMUNE), CURRENTLY PREGNANT: Status: RESOLVED | Noted: 2024-02-28 | Resolved: 2024-04-29

## 2024-04-29 PROBLEM — O99.213 OBESITY AFFECTING PREGNANCY IN THIRD TRIMESTER: Status: RESOLVED | Noted: 2023-10-25 | Resolved: 2024-04-29

## 2024-04-29 PROBLEM — Z28.39 SUSCEPTIBLE TO VARICELLA (NON-IMMUNE), CURRENTLY PREGNANT: Status: RESOLVED | Noted: 2024-02-28 | Resolved: 2024-04-29

## 2024-04-29 PROBLEM — Z3A.38 38 WEEKS GESTATION OF PREGNANCY: Status: RESOLVED | Noted: 2023-10-25 | Resolved: 2024-04-29

## 2024-04-29 LAB — SL AMB POCT URINE HCG: NEGATIVE

## 2024-04-29 PROCEDURE — 58300 INSERT INTRAUTERINE DEVICE: CPT | Performed by: OBSTETRICS & GYNECOLOGY

## 2024-04-29 PROCEDURE — 81025 URINE PREGNANCY TEST: CPT | Performed by: OBSTETRICS & GYNECOLOGY

## 2024-04-29 NOTE — TELEPHONE ENCOUNTER
Patient called office after having IUD inserted this morning, to notify Dr. Dumas that she can feel the strings of IUD poking her inside and she can feel them with her finger when she sticks her finger a finger tip inside her vagina. She would like to know if this is normal/what to do.  I explained I would reach out to Dr. Dumas and someone would reach back out to her.

## 2024-04-29 NOTE — PROGRESS NOTES
Iud insertions    Date/Time: 4/29/2024 9:30 AM    Performed by: Margaux Dumas MD  Authorized by: Margaux Dumas MD    Verbal consent obtained?: Yes    Written consent obtained?: Yes    Risks and benefits: Risks, benefits and alternatives were discussed    Consent given by:  Patient  Patient states understanding of procedure being performed: Yes    Patient's understanding of procedure matches consent: Yes    Procedure consent matches procedure scheduled: Yes    Required items: Required blood products, implants, devices and special equipment available    Patient identity confirmed:  Verbally with patient  Procedure:     Negative urine pregnancy test: yes      Cervix cleaned and prepped: yes      Speculum placed in vagina: yes      Tenaculum applied to cervix: yes      Uterus sounded: yes      Uterus sound depth (cm):  8    IUD inserted with no complications: yes      IUD type:  Mirena    Strings trimmed: yes (4cm)    Post-procedure:     Patient tolerated procedure well: yes      Patient will follow up after next period: yes        Later in the afternoon, pt felt poking.  IUD strings trimmed.  IUD appears to be in correct location

## 2024-06-25 ENCOUNTER — ANNUAL EXAM (OUTPATIENT)
Dept: OBGYN CLINIC | Facility: CLINIC | Age: 24
End: 2024-06-25
Payer: COMMERCIAL

## 2024-06-25 VITALS
DIASTOLIC BLOOD PRESSURE: 80 MMHG | SYSTOLIC BLOOD PRESSURE: 114 MMHG | WEIGHT: 284 LBS | HEIGHT: 70 IN | BODY MASS INDEX: 40.66 KG/M2

## 2024-06-25 DIAGNOSIS — Z01.419 WOMEN'S ANNUAL ROUTINE GYNECOLOGICAL EXAMINATION: Primary | ICD-10-CM

## 2024-06-25 DIAGNOSIS — Z30.431 IUD CHECK UP: ICD-10-CM

## 2024-06-25 PROBLEM — O13.9 GESTATIONAL HYPERTENSION: Status: RESOLVED | Noted: 2024-02-28 | Resolved: 2024-06-25

## 2024-06-25 PROCEDURE — S0612 ANNUAL GYNECOLOGICAL EXAMINA: HCPCS | Performed by: OBSTETRICS & GYNECOLOGY

## 2024-06-25 NOTE — PROGRESS NOTES
ASSESSMENT & PLAN:   Diagnoses and all orders for this visit:    Women's annual routine gynecological examination    IUD check up          The following were reviewed in today's visit: ASCCP guidelines, Gardisil vaccination, STD testing breast self exam, exercise, and healthy diet.    Patient to return to office in yearly for annual exam.     All questions have been answered to her satisfaction.        CC:  Annual Gynecologic Examination  Chief Complaint   Patient presents with    Gynecologic Exam     Annual exam, pap not indicated. Pt is well, no concerns at this time.   neg pap 20, 23  [place]  24  Mirena inserted 24       HPI: Elizabeth Camacho is a 23 y.o.  who presents for annual gynecologic examination.  She has the following concerns:  none      Health Maintenance:    Exercise: intermittently  Breast exams/breast awareness: yes    Past Medical History:   Diagnosis Date    Herpes     type 1 vaginally    Varicella     immunized       Past Surgical History:   Procedure Laterality Date    MOUTH SURGERY      Haugen teeth       Past OB/Gyn History:   No LMP recorded. (Menstrual status: Birth Control).    Last Pap:  : no abnormalities  History of abnormal Pap smear: no  HPV vaccine completed: unknown    Patient is currently sexually active.   STD testing: no  Current contraception: IUD      Family History  Family History   Problem Relation Age of Onset    Thyroid disease Mother     No Known Problems Father     No Known Problems Brother     Cancer Maternal Grandmother         Myeloma    Thyroid disease Maternal Grandmother     Diabetes Maternal Grandfather     Stroke Maternal Grandfather        Family history of uterine or ovarian cancer: no  Family history of breast cancer: no  Family history of colon cancer: no    Social History:  Social History     Socioeconomic History    Marital status: /Civil Union     Spouse name: Not on file    Number of children: Not on file    Years of  education: Not on file    Highest education level: Not on file   Occupational History    Not on file   Tobacco Use    Smoking status: Never     Passive exposure: Never    Smokeless tobacco: Never   Vaping Use    Vaping status: Never Used   Substance and Sexual Activity    Alcohol use: Not Currently     Comment: social    Drug use: Not Currently     Types: Marijuana    Sexual activity: Yes     Partners: Male     Birth control/protection: None, I.U.D.     Comment: Mirena inserted 04/29/2024   Other Topics Concern    Not on file   Social History Narrative    Not on file     Social Determinants of Health     Financial Resource Strain: Not on file   Food Insecurity: No Food Insecurity (3/1/2024)    Hunger Vital Sign     Worried About Running Out of Food in the Last Year: Never true     Ran Out of Food in the Last Year: Never true   Transportation Needs: No Transportation Needs (3/1/2024)    PRAPARE - Transportation     Lack of Transportation (Medical): No     Lack of Transportation (Non-Medical): No   Physical Activity: Not on file   Stress: Not on file   Social Connections: Unknown (6/18/2024)    Received from [facility]    Social Twitch     How often do you feel lonely or isolated from those around you? (Adult - for ages 18 years and over): Not on file   Intimate Partner Violence: Not on file   Housing Stability: Low Risk  (3/1/2024)    Housing Stability Vital Sign     Unable to Pay for Housing in the Last Year: No     Number of Times Moved in the Last Year: 1     Homeless in the Last Year: No     Domestic violence screen: negative    Allergies:  No Known Allergies    Medications:    Current Outpatient Medications:     Prenatal MV-Min-Fe Fum-FA-DHA (PRENATAL 1 PO), Take by mouth, Disp: , Rfl:     Current Facility-Administered Medications:     levonorgestrel (MIRENA) IUD 20 mcg/day, 1 each, Intrauterine Device, Once every 8 years, Margaux Dumas MD, 1 Intra Uterine Device at 04/29/24 1003    Review of  "Systems:  Review of Systems   Constitutional: Negative.    HENT: Negative.     Respiratory: Negative.     Cardiovascular: Negative.    Gastrointestinal: Negative.    Genitourinary: Negative.    Neurological: Negative.    Psychiatric/Behavioral: Negative.           Physical Exam:  /80 (BP Location: Left arm, Patient Position: Sitting, Cuff Size: Standard)   Ht 5' 10\" (1.778 m)   Wt 129 kg (284 lb)   Breastfeeding Yes Comment: Mirena inserted 04/29/2024  BMI 40.75 kg/m²    Physical Exam  Constitutional:       Appearance: Normal appearance.   Genitourinary:      Bladder and urethral meatus normal.      No lesions in the vagina.      Right Labia: No rash, tenderness, lesions, skin changes or Bartholin's cyst.     Left Labia: No tenderness, lesions, skin changes, Bartholin's cyst or rash.     No vaginal erythema, tenderness or bleeding.        Right Adnexa: not tender, not full and no mass present.     Left Adnexa: not tender, not full and no mass present.     Cervix is parous.      No cervical motion tenderness, discharge, lesion or polyp.      IUD strings visualized.      Uterus is not enlarged, fixed or tender.      No uterine mass detected.     Urethral meatus caruncle not present.     No urethral tenderness or mass present.   Breasts:     Right: No swelling, bleeding, inverted nipple, mass, nipple discharge, skin change or tenderness.      Left: No swelling, bleeding, inverted nipple, mass, nipple discharge, skin change or tenderness.   HENT:      Head: Normocephalic and atraumatic.   Eyes:      Extraocular Movements: Extraocular movements intact.      Conjunctiva/sclera: Conjunctivae normal.      Pupils: Pupils are equal, round, and reactive to light.   Cardiovascular:      Rate and Rhythm: Normal rate and regular rhythm.      Heart sounds: Normal heart sounds. No murmur heard.  Pulmonary:      Effort: Pulmonary effort is normal. No respiratory distress.      Breath sounds: Normal breath sounds. No " wheezing or rales.   Abdominal:      General: There is no distension.      Palpations: Abdomen is soft.      Tenderness: There is no abdominal tenderness. There is no guarding.   Neurological:      General: No focal deficit present.      Mental Status: She is alert and oriented to person, place, and time.   Skin:     General: Skin is warm and dry.   Psychiatric:         Mood and Affect: Mood normal.         Behavior: Behavior normal.   Vitals and nursing note reviewed.

## 2025-03-19 ENCOUNTER — PROCEDURE VISIT (OUTPATIENT)
Dept: OBGYN CLINIC | Facility: CLINIC | Age: 25
End: 2025-03-19
Payer: COMMERCIAL

## 2025-03-19 VITALS
SYSTOLIC BLOOD PRESSURE: 118 MMHG | WEIGHT: 264 LBS | BODY MASS INDEX: 37.8 KG/M2 | HEIGHT: 70 IN | DIASTOLIC BLOOD PRESSURE: 80 MMHG

## 2025-03-19 DIAGNOSIS — Z30.432 ENCOUNTER FOR IUD REMOVAL: Primary | ICD-10-CM

## 2025-03-19 PROCEDURE — 58301 REMOVE INTRAUTERINE DEVICE: CPT | Performed by: OBSTETRICS & GYNECOLOGY

## 2025-03-22 NOTE — PROGRESS NOTES
IUD Procedure    Date/Time: 3/19/2025 11:30 AM    Performed by: Renay Suazo MD  Authorized by: Renay Suazo MD    Verbal consent obtained?: Yes    Risks and benefits: Risks, benefits and alternatives were discussed (difficult removal requiring ultrasound guidance, breakage of IUD, need for hysteroscopy and removal of IUD in OR)    Consent given by:  Patient  Patient states understanding of procedure being performed: Yes    Patient's understanding of procedure matches consent: Yes    Required items: Required blood products, implants, devices and special equipment available    Patient identity confirmed:  Verbally with patient  Select procedure: IUD removal    IUD Removal:     Reason for removal: desire for fertility      Removed without complications: yes      Strings visualized: yes      IUD intact: yes    Removal Comments:      Patient placed in dorsal lithotomy position. Speculum placed into the vagina for clear visualization of the cervix and IUD strings clearly visualized at the external os of the cervix. Ring forceps were used to grasp the IUD strings and the IUD was removed in entirety with 1 pull. No complications occurred and the patient tolerated the procedure well.

## 2025-05-13 ENCOUNTER — NURSE TRIAGE (OUTPATIENT)
Age: 25
End: 2025-05-13

## 2025-05-13 NOTE — TELEPHONE ENCOUNTER
"FOLLOW UP: Precautions given to call back or go to ED. ESC to on call provider.  Provider advised to continue to monitor.      REASON FOR CONVERSATION: Vaginal Bleeding - Pregnant    SYMPTOMS: 6w2d by LMP 3/30, .  Vaginal bleeding when wiping, very mild cramping.  D&V .     OTHER: Denies recent intercourse or other symptoms.     DISPOSITION: Home Care with Provider Message (overriding Home Care)      6w2d by LMP 3/30, .  Reason for Disposition   SPOTTING (single or brief episode)    Answer Assessment - Initial Assessment Questions  1. ONSET: \"When did this bleeding start?\"        Just now when wiping  2. BLEEDING SEVERITY: \"Describe the bleeding that you are having.\" \"How much bleeding is there?\"       Red spotting when wiping  3. ABDOMEN PAIN: \"Do you have any pain?\" \"How bad is the pain?\"  (e.g., Scale 0-10; none, mild, moderate, or severe)      Mild cramping  4. PREGNANCY: \"Do you know how many weeks or months pregnant you are?\" \"When was the first day of your last normal menstrual period?\"      6w2d  5. ULTRASOUND: \"Have you had an ultrasound during this pregnancy?\"  Note: To confirm intrauterine pregnancy, placenta location.      denies  6. HEMODYNAMIC STATUS: \"Are you weak or feeling lightheaded?\" If Yes, ask: \"Can you stand and walk normally?\"       denies  7. OTHER SYMPTOMS: \"What other symptoms are you having with the bleeding?\" (e.g., passed tissue, vaginal discharge, fever, menstrual-type cramps)      denies    Protocols used: Pregnancy - Vaginal Bleeding Less Than 20 Weeks EGA-Adult-[place]    "

## 2025-05-28 ENCOUNTER — ULTRASOUND (OUTPATIENT)
Dept: OBGYN CLINIC | Facility: CLINIC | Age: 25
End: 2025-05-28

## 2025-05-28 VITALS
DIASTOLIC BLOOD PRESSURE: 84 MMHG | HEIGHT: 70 IN | BODY MASS INDEX: 37.99 KG/M2 | SYSTOLIC BLOOD PRESSURE: 124 MMHG | WEIGHT: 265.4 LBS

## 2025-05-28 DIAGNOSIS — Z32.01 PREGNANCY, CONFIRMED, NOT FIRST: Primary | ICD-10-CM

## 2025-05-28 NOTE — PROGRESS NOTES
"Assessment/Plan:  Diagnoses and all orders for this visit:    Pregnancy, confirmed, not first  -     Ambulatory Referral to Maternal Fetal Medicine; Future  -     AMB US OB < 14 weeks single or first gestation level 1      - Viable IUP @ 7w 5d EGA  - ZEE 2026  - Continue PNV  - Patient to call for concerns  - RTO ~ 10-12 weeks for OB intake  - call MFM for 13 week NT scan/genetic testing.     Subjective:      Patient ID: Elizabeth Camacho 2000     Elizabeth Camacho is a 24 y.o.  presenting to the office for pregnancy confirmation. Patient's last menstrual period was 2025 (exact date). , placing her at 8w3d today with ZEE of 2025. She is feeling well.      Regular periods:no, IUD removed 3/19/25 - she had irregular menses on IUD and had menses twice in March. LMP 3/30/2025.  Nausea:no  Vomiting: no  Bleeding: yes, one day of spotting  Cramping: no  Headaches: no  Fatigue: no  Constipation: no  Blood type, if known: A+       OB History    Para Term  AB Living   2 1 1 0 0 1   SAB IAB Ectopic Multiple Live Births   0 0 0 0 1      # Outcome Date GA Lbr Jose/2nd Weight Sex Type Anes PTL Lv   2 Current            1 Term 24 38w1d / 00:37 3480 g (7 lb 10.8 oz) M Vag-Spont EPI N MARLIN      Obstetric Comments   Menarche 14   Mirena inserted 2024          The following portions of the patient's history were reviewed and updated as appropriate: allergies, current medications, past family history, past medical history, past social history, past surgical history, and problem list.    Allergies:  Patient has no known allergies.    Medications:  Current Medications[1]    Objective:    Visit Vitals  /84 (BP Location: Left arm, Patient Position: Sitting, Cuff Size: Large)   Ht 5' 10\" (1.778 m)   Wt 120 kg (265 lb 6.4 oz)   LMP 2025 (Exact Date)   BMI 38.08 kg/m²   OB Status Pregnant   Smoking Status Never   BSA 2.35 m²     GEN: The patient was alert and oriented x3, pleasant " well-appearing female in no acute distress.   PULM: nonlabored respirations  MSK: Normal gait  : WNL  Skin: warm, dry  Neuro: no focal deficits  Psych: normal affect and judgement, cooperative    Ultrasound:     Viability US     Gestational sac: present               Location: intrauterine  Yolk sac: present  Fetal pole: present               CRL: 1.46 cm = 7w5d  Cardiac activity: present               Rate: 169 bpm     Ovaries: normal appearing bilaterally  Cul de sac: absence of free fluid  Uterus: normal in appearance           Ultrasound Probe Disinfection    A transvaginal ultrasound was performed.   Prior to use, disinfection was performed with High Level Disinfection Process (Jamiion)  Probe serial number [id number]: [id number] was used    Nakia Chaves PA-C  05/29/25  9:12 AM          [1]   Current Outpatient Medications:     Prenatal MV-Min-Fe Fum-FA-DHA (PRENATAL 1 PO), Take by mouth, Disp: , Rfl:   No current facility-administered medications for this visit.

## 2025-06-12 NOTE — PATIENT INSTRUCTIONS
.Congratulations!! Please review our Pregnancy Essential Guide and [facility] L&D Virtual tour from our networks website.     [facility] Pregnancy Essentials Guide  [facility] ([email or web address])     [facility] ([facility])

## 2025-06-16 ENCOUNTER — INITIAL PRENATAL (OUTPATIENT)
Dept: OBGYN CLINIC | Facility: CLINIC | Age: 25
End: 2025-06-16

## 2025-06-16 VITALS
DIASTOLIC BLOOD PRESSURE: 82 MMHG | BODY MASS INDEX: 37.99 KG/M2 | WEIGHT: 265.4 LBS | SYSTOLIC BLOOD PRESSURE: 122 MMHG | HEIGHT: 70 IN

## 2025-06-16 DIAGNOSIS — Z36.9 ENCOUNTER FOR ANTENATAL SCREENING: Primary | ICD-10-CM

## 2025-06-16 DIAGNOSIS — R63.8 INCREASED BMI: ICD-10-CM

## 2025-06-16 DIAGNOSIS — Z31.430 ENCOUNTER OF FEMALE FOR TESTING FOR GENETIC DISEASE CARRIER STATUS FOR PROCREATIVE MANAGEMENT: ICD-10-CM

## 2025-06-16 DIAGNOSIS — Z34.81 PRENATAL CARE, SUBSEQUENT PREGNANCY, FIRST TRIMESTER: ICD-10-CM

## 2025-06-16 PROCEDURE — OBC

## 2025-06-16 NOTE — PROGRESS NOTES
.  OB INTAKE INTERVIEW  Patient is 24 y.o. who presents for OB intake at 10.3 wks  She is accompanied by herself during this encounter  The father of her baby (Roman) is involved in the pregnancy and is 27 years old.      Last Menstrual Period: 2025  Ultrasound: Measured 7 weeks 5 days on 2025  Estimated Date of Delivery: 2026  confirmed By LMP    Signs/Symptoms of Pregnancy  Current pregnancy symptoms:  light nausea  Constipation no  Headaches YES, mild headaches  Cramping/spotting YES, cramping  beginning, spotting 2x early pregnancy   PICA cravings no    Diabetes-  Body mass index is 38.08 kg/m².  If patient has 1 or more, please order early 1 hour GTT  History of GDM no  BMI >35 YES  History of PCOS or current metformin use (should stop for 7 days prior to 1hr GTT unless pre-existing diabetes)  no  History of LGA/macrosomic infant (4000g/9lbs) no    If patient has 2 or more, please order early 1 hour GTT  BMI>30 YES  AMA no  First degree relative with type 2 diabetes  no  History of chronic HTN, hyperlipidemia, elevated A1C no  High risk race (, , ,  or ) YES  AFA  Patient     Hypertension- if you answer yes to any of the following, please order baseline preeclampsia labs (cbc, comprehensive metabolic panel, urine protein creatinine ratio, uric acid)  History of of chronic HTN no  History of gestational HTN no  History of preeclampsia, eclampsia, or HELLP syndrome no  History of diabetes no  History of lupus,sjogrens syndrome, kidney disease no    Thyroid- if yes order TSH with reflex T4  History of thyroid disease no    Bleeding Disorder or Hx of DVT-patient or first degree relative with history of. Order the following if not done previously.   (Factor V, antithrombin III, prothrombin gene mutation, protein C and S Ag, lupus anticoagulant, anticardiolipin, beta-2 glycoprotein)   no    OB/GYN-  History of abnormal pap smear no        Date of last pap smear 2023  History of HPV no  History of Herpes/HSV YES  History of other STI (gonorrhea, chlamydia, trich) no  History of prior  YES  History of prior  no  History of  delivery prior to 36 weeks 6 days no  History of Varicella or Vaccination  immunized   History of blood transfusion no  Ok for blood transfusion  yes    Substance screening-   History of tobacco use no  Currently using tobacco no  Substance Use Screen Level  N/A    MRSA Screening-   Does the pt have a hx of MRSA? no    Immunizations:  Influenza vaccine given this season  no  Discussed Tdap vaccine  yes  Discussed COVID Vaccine  yes    Genetic/[facility]-  Do you or your partner have a history of any of the following in yourselves or first degree relatives?  Cystic fibrosis no  Spinal muscular atrophy no  Hemoglobinopathy/Sickle Cell/Thalassemia no  Fragile X Intellectual Disability no         discussed Carrier Screening being completed once in a lifetime as a standard of care lab test. Declined , also declined [facility] [place]     Appointment for Nuchal Translucency Ultrasound at [facility] scheduled for 2025      Interview education  [facility] Pregnancy Essentials Book reviewed, discussed and attached to their AVS  yes    Nurse/Family Partnership- patient may qualify  NO referral placed  No    Prenatal lab work scripts  yes  Extra labs ordered:   1 hr gtt    Aspirin/Preeclampsia Screen    Risk Level Risk Factor Recommendation   LOW Prior Uncomplicated full-term delivery YES No Aspirin recommendation        MODERATE Nulliparity no Recommend low-dose aspirin if     BMI>30 YES 2 or more moderate risk factors    Family History Preeclampsia (mother/sister) no     35yr old or greater no     Black Race, Concern for SDOH/Low Socioeconomic no     IVF Pregnancy  no     Personal History Risks (low birth weight, prior adverse preg outcome, >10yr preg interval) no         HIGH History of Preeclampsia no Recommend low-dose aspirin  if     Multifetal gestation no 1 or more high risk factors    Chronic HTN no     Type 1 or 2 Diabetes no     Renal Disease no     Autoimmune Disease  no      Contraindications to ASA therapy:  NSAID/ ASA allergy: no  Nasal polyps: no  Asthma with history of ASA induced bronchospasm: no  Relative contraindications:  History of GI bleed: no  Active peptic ulcer disease: no  Severe hepatic dysfunction: no    Patient should be recommended to take ASA 162mg during this pregnancy from 12-36wks to lower her risk of preeclampsia:  discussed , took for whole pregnancy           The patient has a history now or in prior pregnancy notable for:  none      Details that I feel the provider should be aware of: This is a planned and welcomed pregnancy for parents. Patient is feeling generally well. Patient is experiencing some  light nausea, mild headaches, cramping and spotting early gestation 2X.OTC medications and diet were discussed. Patient had a full term  vaginal delivery at 38w1d On 02/29/2024. She was GBS + at that time. Patient BMI is 38.08 a 1 hr gtt was ordered.She has a  medical history of Acquired kyphosis, HSV, she smoked marijuana occasional prior to pregnancy . Patient mom is prediabetic and has thyroid disease.Discussed  recommendation  to take ASA 162mg during this pregnancy from 12-36wks to lower her risk of preeclampsia, Patient did take this her last pregnancy . Patient knows to call OB for symptoms and how to contact her nurse navigator. Patient was made aware to have lab orders completed before next appointment. Patient denies any questions at this point and verbalizes understanding.PN1 visit scheduled. The patient was oriented to our practice, the navigator role, reviewed delivering physicians and [facility] for Delivery. All questions were answered.    Interviewed by: Electronically Signed by Heather Muñoz LPN , OB nurse Navigator

## 2025-06-20 ENCOUNTER — LAB (OUTPATIENT)
Age: 25
End: 2025-06-20
Attending: OBSTETRICS & GYNECOLOGY
Payer: COMMERCIAL

## 2025-06-20 DIAGNOSIS — Z34.81 PRENATAL CARE, SUBSEQUENT PREGNANCY, FIRST TRIMESTER: ICD-10-CM

## 2025-06-20 DIAGNOSIS — R63.8 INCREASED BMI: ICD-10-CM

## 2025-06-20 LAB
ABO GROUP BLD: NORMAL
BACTERIA UR QL AUTO: ABNORMAL /HPF
BASOPHILS # BLD AUTO: 0.03 THOUSANDS/ÂΜL (ref 0–0.1)
BASOPHILS NFR BLD AUTO: 1 % (ref 0–1)
BILIRUB UR QL STRIP: NEGATIVE
BLD GP AB SCN SERPL QL: NEGATIVE
CLARITY UR: ABNORMAL
COLOR UR: ABNORMAL
EOSINOPHIL # BLD AUTO: 0.08 THOUSAND/ÂΜL (ref 0–0.61)
EOSINOPHIL NFR BLD AUTO: 2 % (ref 0–6)
ERYTHROCYTE [DISTWIDTH] IN BLOOD BY AUTOMATED COUNT: 14.2 % (ref 11.6–15.1)
GLUCOSE 1H P 50 G GLC PO SERPL-MCNC: 73 MG/DL (ref 70–134)
GLUCOSE UR STRIP-MCNC: NEGATIVE MG/DL
HBV SURFACE AB SER-ACNC: 4.28 MIU/ML
HBV SURFACE AG SER QL: NORMAL
HCT VFR BLD AUTO: 39.5 % (ref 34.8–46.1)
HCV AB SER QL: NORMAL
HGB BLD-MCNC: 12.9 G/DL (ref 11.5–15.4)
HGB UR QL STRIP.AUTO: NEGATIVE
IMM GRANULOCYTES # BLD AUTO: 0.01 THOUSAND/UL (ref 0–0.2)
IMM GRANULOCYTES NFR BLD AUTO: 0 % (ref 0–2)
KETONES UR STRIP-MCNC: NEGATIVE MG/DL
LEUKOCYTE ESTERASE UR QL STRIP: NEGATIVE
LYMPHOCYTES # BLD AUTO: 1.71 THOUSANDS/ÂΜL (ref 0.6–4.47)
LYMPHOCYTES NFR BLD AUTO: 33 % (ref 14–44)
MCH RBC QN AUTO: 29.1 PG (ref 26.8–34.3)
MCHC RBC AUTO-ENTMCNC: 32.7 G/DL (ref 31.4–37.4)
MCV RBC AUTO: 89 FL (ref 82–98)
MONOCYTES # BLD AUTO: 0.41 THOUSAND/ÂΜL (ref 0.17–1.22)
MONOCYTES NFR BLD AUTO: 8 % (ref 4–12)
NEUTROPHILS # BLD AUTO: 3.01 THOUSANDS/ÂΜL (ref 1.85–7.62)
NEUTS SEG NFR BLD AUTO: 56 % (ref 43–75)
NITRITE UR QL STRIP: NEGATIVE
NON-SQ EPI CELLS URNS QL MICRO: ABNORMAL /HPF
NRBC BLD AUTO-RTO: 0 /100 WBCS
PH UR STRIP.AUTO: 7.5 [PH]
PLATELET # BLD AUTO: 305 THOUSANDS/UL (ref 149–390)
PMV BLD AUTO: 9.5 FL (ref 8.9–12.7)
PROT UR STRIP-MCNC: ABNORMAL MG/DL
RBC # BLD AUTO: 4.44 MILLION/UL (ref 3.81–5.12)
RBC #/AREA URNS AUTO: ABNORMAL /HPF
RH BLD: POSITIVE
RUBV IGG SERPL IA-ACNC: 91.2 IU/ML
SP GR UR STRIP.AUTO: 1.02 (ref 1–1.03)
SPECIMEN EXPIRATION DATE: NORMAL
TREPONEMA PALLIDUM IGG+IGM AB [PRESENCE] IN SERUM OR PLASMA BY IMMUNOASSAY: NORMAL
UROBILINOGEN UR STRIP-ACNC: <2 MG/DL
WBC # BLD AUTO: 5.25 THOUSAND/UL (ref 4.31–10.16)
WBC #/AREA URNS AUTO: ABNORMAL /HPF

## 2025-06-20 PROCEDURE — 87389 HIV-1 AG W/HIV-1&-2 AB AG IA: CPT

## 2025-06-20 PROCEDURE — 86762 RUBELLA ANTIBODY: CPT

## 2025-06-20 PROCEDURE — 86780 TREPONEMA PALLIDUM: CPT

## 2025-06-20 PROCEDURE — 86803 HEPATITIS C AB TEST: CPT

## 2025-06-20 PROCEDURE — 86787 VARICELLA-ZOSTER ANTIBODY: CPT

## 2025-06-20 PROCEDURE — 86900 BLOOD TYPING SEROLOGIC ABO: CPT

## 2025-06-20 PROCEDURE — 86850 RBC ANTIBODY SCREEN: CPT

## 2025-06-20 PROCEDURE — 81001 URINALYSIS AUTO W/SCOPE: CPT

## 2025-06-20 PROCEDURE — 86706 HEP B SURFACE ANTIBODY: CPT

## 2025-06-20 PROCEDURE — 85025 COMPLETE CBC W/AUTO DIFF WBC: CPT

## 2025-06-20 PROCEDURE — 82950 GLUCOSE TEST: CPT

## 2025-06-20 PROCEDURE — 86901 BLOOD TYPING SEROLOGIC RH(D): CPT

## 2025-06-20 PROCEDURE — 36415 COLL VENOUS BLD VENIPUNCTURE: CPT

## 2025-06-20 PROCEDURE — 87340 HEPATITIS B SURFACE AG IA: CPT

## 2025-06-20 PROCEDURE — 87086 URINE CULTURE/COLONY COUNT: CPT

## 2025-06-21 ENCOUNTER — RESULTS FOLLOW-UP (OUTPATIENT)
Dept: LABOR AND DELIVERY | Facility: HOSPITAL | Age: 25
End: 2025-06-21

## 2025-06-21 LAB
BACTERIA UR CULT: NORMAL
HIV 1+2 AB+HIV1 P24 AG SERPL QL IA: NORMAL
VZV IGG SER QL IA: 0.27 S/CO
VZV IGG SER QL IA: NEGATIVE

## 2025-06-23 ENCOUNTER — NURSE TRIAGE (OUTPATIENT)
Dept: OTHER | Facility: OTHER | Age: 25
End: 2025-06-23

## 2025-06-24 NOTE — TELEPHONE ENCOUNTER
"REASON FOR CONVERSATION: Vaginal Bleeding    SYMPTOMS: very light spotting with wiping only. Bright red in color. 11wks. 3 days pregnant    OTHER HEALTH INFORMATION: 2/10 cramping also. Just arrived on vacation in [place] and she did do a lot yesterday getting ready for it. Had one episode of same bleeding at 7wks.    PROTOCOL DISPOSITION: Home Care    CARE ADVICE PROVIDED: Patient understands to take it easy tomorrow. No sexual intercourse, no douching, no hot tubs.     PRACTICE FOLLOW-UP: Patient will call back during office hours if bleeding gets worse or continues over 48 hours      Reason for Disposition   SPOTTING (single or brief episode)    Answer Assessment - Initial Assessment Questions  1. ONSET: \"When did this bleeding start?\"        This afternoon after they arrived on vacation in [place]. Did a lot yesterday with getting ready for vacation.    2. BLEEDING SEVERITY: \"Describe the bleeding that you are having.\" \"How much bleeding is there?\"       Only when she wipes very light spotting that is bright red. No need to wear a liner.    3. ABDOMEN PAIN: \"Do you have any pain?\" \"How bad is the pain?\"  (e.g., Scale 0-10; none, mild, moderate, or severe)      2/10- very light cramping.    4. PREGNANCY: \"Do you know how many weeks or months pregnant you are?\" \"When was the first day of your last normal menstrual period?\"      11wks 3 days   P:1   ZEE: 2026    Did have the spotting when she was 7 weeks pregnant but it stopped then.    5. ULTRASOUND: \"Have you had an ultrasound during this pregnancy?\"  Note: To confirm intrauterine pregnancy, placenta location.      Positive US    6. HEMODYNAMIC STATUS: \"Are you weak or feeling lightheaded?\" If Yes, ask: \"Can you stand and walk normally?\"       Denies    7. OTHER SYMPTOMS: \"What other symptoms are you having with the bleeding?\" (e.g., passed tissue, vaginal discharge, fever, menstrual-type cramps)      Denies    Protocols used: Pregnancy - Vaginal " Bleeding Less Than 20 Weeks EGA-Adult-AH

## 2025-06-26 ENCOUNTER — OB ABSTRACT (OUTPATIENT)
Dept: OBGYN CLINIC | Facility: CLINIC | Age: 25
End: 2025-06-26

## 2025-06-26 ENCOUNTER — TELEPHONE (OUTPATIENT)
Age: 25
End: 2025-06-26

## 2025-06-26 NOTE — TELEPHONE ENCOUNTER
PT called in stated she is out of town and had a miscarriage, she did go to ED where she is. Wants to know about making a follow up visit regarding this and would like to cancel future appointments. Please follow up.

## 2025-06-26 NOTE — TELEPHONE ENCOUNTER
Spoke with patient, on vacation in [place].  Miscarriage on Monday night, was seen in local ED.  ED staff states she passed everything.  Patient is going to keep her appointment on 7/1 for follow up with us.

## 2025-07-01 ENCOUNTER — APPOINTMENT (OUTPATIENT)
Dept: LAB | Facility: AMBULARY SURGERY CENTER | Age: 25
End: 2025-07-01
Payer: COMMERCIAL

## 2025-07-01 ENCOUNTER — OFFICE VISIT (OUTPATIENT)
Dept: OBGYN CLINIC | Facility: CLINIC | Age: 25
End: 2025-07-01
Payer: COMMERCIAL

## 2025-07-01 VITALS — WEIGHT: 263 LBS | DIASTOLIC BLOOD PRESSURE: 90 MMHG | SYSTOLIC BLOOD PRESSURE: 130 MMHG | BODY MASS INDEX: 37.74 KG/M2

## 2025-07-01 DIAGNOSIS — Z87.59 HISTORY OF MISCARRIAGE: Primary | ICD-10-CM

## 2025-07-01 DIAGNOSIS — Z87.59 HISTORY OF MISCARRIAGE: ICD-10-CM

## 2025-07-01 LAB
ABO GROUP BLD: NORMAL
B-HCG SERPL-ACNC: 39.1 MIU/ML (ref 0–5)
BLD GP AB SCN SERPL QL: NEGATIVE
RH BLD: POSITIVE
SPECIMEN EXPIRATION DATE: NORMAL

## 2025-07-01 PROCEDURE — 36415 COLL VENOUS BLD VENIPUNCTURE: CPT

## 2025-07-01 PROCEDURE — 86901 BLOOD TYPING SEROLOGIC RH(D): CPT

## 2025-07-01 PROCEDURE — 86900 BLOOD TYPING SEROLOGIC ABO: CPT

## 2025-07-01 PROCEDURE — 99213 OFFICE O/P EST LOW 20 MIN: CPT | Performed by: PHYSICIAN ASSISTANT

## 2025-07-01 PROCEDURE — 84702 CHORIONIC GONADOTROPIN TEST: CPT

## 2025-07-01 PROCEDURE — 86850 RBC ANTIBODY SCREEN: CPT

## 2025-07-01 NOTE — PROGRESS NOTES
ASSESSMENT/PLAN:    Encounter Diagnosis     ICD-10-CM    1. History of miscarriage  Z87.59 hCG, quantitative     Type and screen        - ER confirmed no remaining POC on US  -Quantitative HCG ordered  - Type & screen ordered  -Patient to continue to monitor bleeding  -Discussed contraception/conception counseling  -Recommend patient wait until next normal period to try again for conception  -Patient to call for concerns or if she develops fevers, chills, severe abdominal pain  -RTO for annual or sooner for pregnancy/contraception visit      SUBJECTIVE/HPI:      Patient ID: Elizabeth Camacho 2000        Elizabeth Camacho is a 24 y.o.  presenting to the office for miscarriage f/u. She was seen in an ER on vacation after she had heavy vaginal bleeding a week ago. She was told in the ER that she had passed everything but that her uterine lining was still thickened. She continued to have bleeding, but it is lightening now. She is otherwise feeling ok today.       HISTORY:    Problem List[1]    Allergies[2]    Current Medications[3]    OB History          2    Para   1    Term   1       0    AB   1    Living   1         SAB   1    IAB   0    Ectopic   0    Multiple   0    Live Births   1           Obstetric Comments   Menarche 14  Mirena inserted 2024                Past Medical History[4]     Past Surgical History[5]     Family History[6]     REVIEW OF SYSTEMS:  Review of Systems   Constitutional:  Negative for fatigue.   Genitourinary:  Positive for vaginal bleeding. Negative for pelvic pain, vaginal discharge and vaginal pain.        OBJECTIVE:    Visit Vitals  /90 (BP Location: Right arm, Patient Position: Sitting, Cuff Size: Large)   Wt 119 kg (263 lb)   BMI 37.74 kg/m²   OB Status Recent pregnancy   Smoking Status Never   BSA 2.34 m²         Physical Exam  Constitutional:       General: She is not in acute distress.     Appearance: Normal appearance. She is obese. She is not  ill-appearing, toxic-appearing or diaphoretic.   HENT:      Head: Normocephalic and atraumatic.   Pulmonary:      Effort: Pulmonary effort is normal.     Neurological:      General: No focal deficit present.      Mental Status: She is alert.     Psychiatric:         Mood and Affect: Mood normal.         Behavior: Behavior normal.   Vitals reviewed.              [1]   Patient Active Problem List  Diagnosis    Acquired kyphosis   [2] No Known Allergies  [3]   Current Outpatient Medications:     Prenatal MV-Min-Fe Fum-FA-DHA (PRENATAL 1 PO), Take by mouth, Disp: , Rfl:   [4]   Past Medical History:  Diagnosis Date    GBS (group B streptococcus) infection 02/16/2024    Herpes     type 1 vaginally    Migraine     based on sleep    Varicella     immunized   [5]   Past Surgical History:  Procedure Laterality Date    MOUTH SURGERY      [place] teeth   [6]   Family History  Problem Relation Name Age of Onset    Thyroid disease Mother Rufina Tirado     Other (pre diabetes) Mother Rufina Tirado     No Known Problems Father Jw     No Known Problems Brother Salomon     Cancer Maternal Grandmother Connie Lira 75        Myeloma    Thyroid disease Maternal Grandmother Connie Lira     Diabetes Maternal Grandfather Amilcar Lira         type II    Stroke Maternal Grandfather Amilcar Lira     Breast cancer Neg Hx      Colon cancer Neg Hx      Ovarian cancer Neg Hx

## 2025-08-21 ENCOUNTER — TELEPHONE (OUTPATIENT)
Age: 25
End: 2025-08-21